# Patient Record
Sex: MALE | Race: WHITE | Employment: OTHER | URBAN - METROPOLITAN AREA
[De-identification: names, ages, dates, MRNs, and addresses within clinical notes are randomized per-mention and may not be internally consistent; named-entity substitution may affect disease eponyms.]

---

## 2017-01-18 DIAGNOSIS — I10 ESSENTIAL HYPERTENSION: ICD-10-CM

## 2017-02-16 ENCOUNTER — OFFICE VISIT (OUTPATIENT)
Dept: ORTHOPEDIC SURGERY | Age: 62
End: 2017-02-16

## 2017-02-16 VITALS
BODY MASS INDEX: 29.06 KG/M2 | WEIGHT: 203 LBS | SYSTOLIC BLOOD PRESSURE: 140 MMHG | DIASTOLIC BLOOD PRESSURE: 85 MMHG | TEMPERATURE: 97.9 F | RESPIRATION RATE: 16 BRPM | HEIGHT: 70 IN | HEART RATE: 72 BPM

## 2017-02-16 DIAGNOSIS — Z96.651 STATUS POST TOTAL RIGHT KNEE REPLACEMENT: Primary | ICD-10-CM

## 2017-02-16 PROCEDURE — 99213 OFFICE O/P EST LOW 20 MIN: CPT | Performed by: PHYSICIAN ASSISTANT

## 2017-02-16 PROCEDURE — 73562 X-RAY EXAM OF KNEE 3: CPT | Performed by: PHYSICIAN ASSISTANT

## 2017-06-08 RX ORDER — SIMVASTATIN 20 MG
TABLET ORAL
Qty: 90 TABLET | Refills: 3 | Status: SHIPPED | OUTPATIENT
Start: 2017-06-08 | End: 2018-06-13 | Stop reason: SDUPTHER

## 2017-11-20 ENCOUNTER — OFFICE VISIT (OUTPATIENT)
Dept: FAMILY MEDICINE CLINIC | Age: 62
End: 2017-11-20

## 2017-11-20 VITALS
SYSTOLIC BLOOD PRESSURE: 152 MMHG | RESPIRATION RATE: 20 BRPM | BODY MASS INDEX: 31.35 KG/M2 | WEIGHT: 219 LBS | HEIGHT: 70 IN | HEART RATE: 77 BPM | DIASTOLIC BLOOD PRESSURE: 92 MMHG

## 2017-11-20 DIAGNOSIS — G89.29 CHRONIC PAIN OF LEFT KNEE: ICD-10-CM

## 2017-11-20 DIAGNOSIS — M25.562 CHRONIC PAIN OF LEFT KNEE: ICD-10-CM

## 2017-11-20 DIAGNOSIS — I10 ESSENTIAL HYPERTENSION: ICD-10-CM

## 2017-11-20 DIAGNOSIS — M25.552 LEFT HIP PAIN: ICD-10-CM

## 2017-11-20 DIAGNOSIS — Z23 NEEDS FLU SHOT: ICD-10-CM

## 2017-11-20 DIAGNOSIS — Z11.59 NEED FOR HEPATITIS C SCREENING TEST: ICD-10-CM

## 2017-11-20 DIAGNOSIS — Z12.11 SCREEN FOR COLON CANCER: ICD-10-CM

## 2017-11-20 DIAGNOSIS — E78.00 HYPERCHOLESTEROLEMIA: ICD-10-CM

## 2017-11-20 DIAGNOSIS — Z00.00 WELL ADULT EXAM: Primary | ICD-10-CM

## 2017-11-20 LAB
A/G RATIO: 1.5 (ref 1.1–2.2)
ALBUMIN SERPL-MCNC: 4.6 G/DL (ref 3.4–5)
ALP BLD-CCNC: 62 U/L (ref 40–129)
ALT SERPL-CCNC: 32 U/L (ref 10–40)
ANION GAP SERPL CALCULATED.3IONS-SCNC: 13 MMOL/L (ref 3–16)
AST SERPL-CCNC: 26 U/L (ref 15–37)
BILIRUB SERPL-MCNC: 0.7 MG/DL (ref 0–1)
BUN BLDV-MCNC: 11 MG/DL (ref 7–20)
CALCIUM SERPL-MCNC: 9.8 MG/DL (ref 8.3–10.6)
CHLORIDE BLD-SCNC: 100 MMOL/L (ref 99–110)
CHOLESTEROL, TOTAL: 193 MG/DL (ref 0–199)
CO2: 27 MMOL/L (ref 21–32)
CREAT SERPL-MCNC: 0.7 MG/DL (ref 0.8–1.3)
GFR AFRICAN AMERICAN: >60
GFR NON-AFRICAN AMERICAN: >60
GLOBULIN: 3 G/DL
GLUCOSE BLD-MCNC: 93 MG/DL (ref 70–99)
HDLC SERPL-MCNC: 56 MG/DL (ref 40–60)
HEPATITIS C ANTIBODY INTERPRETATION: NORMAL
LDL CHOLESTEROL CALCULATED: 80 MG/DL
POTASSIUM SERPL-SCNC: 4 MMOL/L (ref 3.5–5.1)
SODIUM BLD-SCNC: 140 MMOL/L (ref 136–145)
TOTAL PROTEIN: 7.6 G/DL (ref 6.4–8.2)
TRIGL SERPL-MCNC: 283 MG/DL (ref 0–150)
VLDLC SERPL CALC-MCNC: 57 MG/DL

## 2017-11-20 PROCEDURE — 36415 COLL VENOUS BLD VENIPUNCTURE: CPT | Performed by: FAMILY MEDICINE

## 2017-11-20 PROCEDURE — 90471 IMMUNIZATION ADMIN: CPT | Performed by: FAMILY MEDICINE

## 2017-11-20 PROCEDURE — 90686 IIV4 VACC NO PRSV 0.5 ML IM: CPT | Performed by: FAMILY MEDICINE

## 2017-11-20 PROCEDURE — 99396 PREV VISIT EST AGE 40-64: CPT | Performed by: FAMILY MEDICINE

## 2017-11-20 ASSESSMENT — PATIENT HEALTH QUESTIONNAIRE - PHQ9
SUM OF ALL RESPONSES TO PHQ9 QUESTIONS 1 & 2: 0
2. FEELING DOWN, DEPRESSED OR HOPELESS: 0
1. LITTLE INTEREST OR PLEASURE IN DOING THINGS: 0
SUM OF ALL RESPONSES TO PHQ QUESTIONS 1-9: 0

## 2017-11-20 NOTE — PROGRESS NOTES
Chief Complaint   Patient presents with    Annual Exam       Root Rhineland presents for evaluation and management of Will adult check. 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 Now he tells me he walks about 2 miles 3 times a week. He denies an unhealthy diet doesn't snack a lot but does eat a lot of bread. Tells me he eats more sandwiches. Likes to go out with the boys for Monday night football but doesn't drink much alcohol besides that. He is taking and tolerating both his blood pressure and cholesterol medicine denies any problems with them. He is noting some left hip and knee pain with walking, but wants to wait until he follows up with orthopedics in February for evaluation    He expresses an interest in getting hepatitis C screening and would like a flu shot      ROS: no fever or chills, no headache, no visual changes, no runny nose, no sore throat, no ear pain, no swollen glands or thyroid pain/swelling, no cough, no sob, no chest pain, no palpitation, no abd pain, no n/v/d/c, no urinary frequency or dysuria, no focal weakness or numbness, no other joint pain or swelling, no rash or skin changes, noeasy bleeding or bruising,     No Known Allergies  New Prescriptions    No medications on file     Current Outpatient Prescriptions   Medication Sig Dispense Refill    simvastatin (ZOCOR) 20 MG tablet TAKE 1 TABLET BY MOUTH EVERY EVENING 90 tablet 3    metoprolol tartrate (LOPRESSOR) 25 MG tablet TAKE 1 TABLET BY MOUTH 2 TIMES DAILY 180 tablet 3    azelastine (ASTELIN) 0.1 % nasal spray 2 sprays by Nasal route 2 times daily Use in each nostril as directed 1 Bottle 3     No current facility-administered medications for this visit.         Past Medical History:   Diagnosis Date    Allergic rhinitis, seasonal     Essential hypertension 1/28/2016    Hypercholesterolemia 8/23/2012     Past Surgical History:   Procedure Laterality Date    CERVICAL SPINE SURGERY      EYE SURGERY      HEMORRHOID SURGERY  1983    KNEE SURGERY Right 1978    ACL/MCL repair    LASIK      TONSILLECTOMY      TOTAL KNEE ARTHROPLASTY Right 02-19-16    Dr. Howard Travis     Family History   Problem Relation Age of Onset    Heart Disease Mother 68     Stent    Dementia Mother     Diabetes Father     Heart Disease Father 67     stent     Social History   Substance Use Topics    Smoking status: Never Smoker    Smokeless tobacco: Never Used    Alcohol use 0.0 oz/week      Comment: infrequent E[place]h; only during football season       Objective   BP (!) 152/92   Pulse 77   Resp 20   Ht 5' 10\" (1.778 m)   Wt 219 lb (99.3 kg) Comment: 219lb  BMI 31.42 kg/m²   Wt Readings from Last 3 Encounters:   11/20/17 219 lb (99.3 kg)   02/16/17 203 lb (92.1 kg)   08/16/16 203 lb (92.1 kg)       WDWN in NAD  HEENT: NCAT, PERRL, TM's neg, Canals patent, Nares Pink and Patent, Op/OC: pink and narrow Malampati class IV airway with healthy dentition  Neck: no LAD, no TMG, supple and symmetric  Lungs: CTAB BS Equal and Easy, no accessory muscle use  CV: RRR w/o M,R,G, PP2+, no edema  Abd:  BS+, S, ND, NT, no hsm  Neuro:  A&O x 3, CN2-12 grossly intact, Motor/Sensory intact w/o focal deficit, Coordination intact, normal gait  Psych:  Judgement and insight are intact, Nl Speech and motor activity, no MARILYN, no FOI, dressed casually in street clothes  Skin:  Warm dry and intact      Chemistry        Component Value Date/Time     01/28/2016 1600    K 4.2 01/28/2016 1600    CL 99 01/28/2016 1600    CO2 26 01/28/2016 1600    BUN 10 01/28/2016 1600    CREATININE 0.7 (L) 01/28/2016 1600        Component Value Date/Time    CALCIUM 10.0 01/28/2016 1600    ALKPHOS 69 01/28/2016 1600    AST 23 01/28/2016 1600    ALT 25 01/28/2016 1600    BILITOT 0.4 01/28/2016 1600          Lab Results   Component Value Date    WBC 4.8 02/09/2016    HGB 13.2 (L) 02/21/2016    HCT 38.7 (L) 02/21/2016    .0 (H) 02/09/2016     02/09/2016

## 2017-12-26 ENCOUNTER — PAT TELEPHONE (OUTPATIENT)
Dept: PREADMISSION TESTING | Age: 62
End: 2017-12-26

## 2017-12-26 VITALS — BODY MASS INDEX: 31.35 KG/M2 | HEIGHT: 70 IN | WEIGHT: 219 LBS

## 2017-12-26 RX ORDER — CHLORAL HYDRATE 500 MG
3000 CAPSULE ORAL 3 TIMES DAILY
COMMUNITY

## 2017-12-26 RX ORDER — M-VIT,TX,IRON,MINS/CALC/FOLIC 27MG-0.4MG
1 TABLET ORAL DAILY
COMMUNITY

## 2017-12-28 ENCOUNTER — TELEPHONE (OUTPATIENT)
Dept: FAMILY MEDICINE CLINIC | Age: 62
End: 2017-12-28

## 2017-12-28 NOTE — TELEPHONE ENCOUNTER
Attempted to contact patient on 12/28/2017. Result: left message on the patient's voicemail asking patient to return my call. Pre-Visit planning not completed.

## 2018-01-02 ENCOUNTER — OFFICE VISIT (OUTPATIENT)
Dept: FAMILY MEDICINE CLINIC | Age: 63
End: 2018-01-02

## 2018-01-02 VITALS
RESPIRATION RATE: 20 BRPM | DIASTOLIC BLOOD PRESSURE: 78 MMHG | SYSTOLIC BLOOD PRESSURE: 134 MMHG | BODY MASS INDEX: 30.92 KG/M2 | HEIGHT: 70 IN | WEIGHT: 216 LBS | OXYGEN SATURATION: 97 % | HEART RATE: 64 BPM

## 2018-01-02 DIAGNOSIS — E78.00 HYPERCHOLESTEROLEMIA: ICD-10-CM

## 2018-01-02 DIAGNOSIS — Z12.11 SCREEN FOR COLON CANCER: ICD-10-CM

## 2018-01-02 DIAGNOSIS — I10 ESSENTIAL HYPERTENSION: Primary | ICD-10-CM

## 2018-01-02 DIAGNOSIS — E66.09 CLASS 1 OBESITY DUE TO EXCESS CALORIES WITHOUT SERIOUS COMORBIDITY WITH BODY MASS INDEX (BMI) OF 31.0 TO 31.9 IN ADULT: ICD-10-CM

## 2018-01-02 PROCEDURE — 99214 OFFICE O/P EST MOD 30 MIN: CPT | Performed by: FAMILY MEDICINE

## 2018-01-02 NOTE — PATIENT INSTRUCTIONS
Wt Readings from Last 3 Encounters:   01/02/18 216 lb (98 kg)   12/26/17 219 lb (99.3 kg)   11/20/17 219 lb (99.3 kg)       BP Readings from Last 3 Encounters:   01/02/18 134/78   11/20/17 (!) 152/92   02/16/17 140/85       Here are some recommendations for weight loss:  Check into The GI Diet or \"Primal diet\"    Take your desired weight in pounds and multiply by 10 and that is your average daily calorie allowance. For example if you wish to weigh 170 lb x 10 = 1700 ameya/day (this is how to gradually lose the weight and maintain your desired weight). Avoid soda/coke and all \"wet carbs\" => Drink ice water instead    Drink a large glass of ice water before meals and EAT SLOWLY (talk while you eat)! Rethink your hunger => it means your losing weight. Minimize carbohydrates as they stimulate your appetite.   Avoid Bread, Rice, Pasta and Potatoes      Avoid eating calories after 6 pm

## 2018-01-02 NOTE — PROGRESS NOTES
Chief Complaint   Patient presents with    Hypertension       HPI: Alem Diaz presents for evaluation and management of Multiple medical problems. Shital Grande notes she is taking and tolerating his cholesterol medicine denies any muscle aches or stomach pain. He reports is compliant with his blood pressure medicines and not having any trouble with that. He notes he's not really been working on diet or exercise as he is been caring for his frail father who is in a nursing home. He has a colonoscopy scheduled for tomorrow      ROS: no fever or chills, no cough, no sob, no chest pain, no palpitation, no abd pain, no n/v/d/c    No Known Allergies  New Prescriptions    No medications on file     Current Outpatient Prescriptions   Medication Sig Dispense Refill    Coenzyme Q10 (COQ10 PO) Take by mouth      Omega-3 Fatty Acids (FISH OIL) 1000 MG CAPS Take 3,000 mg by mouth 3 times daily      Multiple Vitamins-Minerals (THERAPEUTIC MULTIVITAMIN-MINERALS) tablet Take 1 tablet by mouth daily      simvastatin (ZOCOR) 20 MG tablet TAKE 1 TABLET BY MOUTH EVERY EVENING 90 tablet 3    metoprolol tartrate (LOPRESSOR) 25 MG tablet TAKE 1 TABLET BY MOUTH 2 TIMES DAILY 180 tablet 3    azelastine (ASTELIN) 0.1 % nasal spray 2 sprays by Nasal route 2 times daily Use in each nostril as directed 1 Bottle 3     No current facility-administered medications for this visit.         Past Medical History:   Diagnosis Date    Allergic rhinitis, seasonal     Essential hypertension 1/28/2016    Hypercholesterolemia 8/23/2012         Objective   /78   Pulse 64   Resp 20   Ht 5' 10\" (1.778 m)   Wt 216 lb (98 kg)   SpO2 97%   BMI 30.99 kg/m²   Wt Readings from Last 3 Encounters:   01/02/18 216 lb (98 kg)   12/26/17 219 lb (99.3 kg)   11/20/17 219 lb (99.3 kg)       WDWN in NAD, Obese  Lungs: CTAB BS Equal and Easy, no accessory muscle use  CV: RRR w/o M,R,G, PP2+, no edema  Abd:  BS+, S, ND, NT, no hsm  Psych: Judgement and insight are intact, Nl Speech and motor activity, no MARILYN, no FOI, dressed casually in street clothes  Skin:  Warm dry and intact      Chemistry        Component Value Date/Time     11/20/2017 0816    K 4.0 11/20/2017 0816     11/20/2017 0816    CO2 27 11/20/2017 0816    BUN 11 11/20/2017 0816    CREATININE 0.7 (L) 11/20/2017 0816        Component Value Date/Time    CALCIUM 9.8 11/20/2017 0816    ALKPHOS 62 11/20/2017 0816    AST 26 11/20/2017 0816    ALT 32 11/20/2017 0816    BILITOT 0.7 11/20/2017 0816          Lab Results   Component Value Date    WBC 4.8 02/09/2016    HGB 13.2 (L) 02/21/2016    HCT 38.7 (L) 02/21/2016    .0 (H) 02/09/2016     02/09/2016     Lab Results   Component Value Date    LABA1C 5.3 02/20/2016     Lab Results   Component Value Date    .4 02/20/2016     Lab Results   Component Value Date    LABA1C 5.3 02/20/2016     No components found for: CHLPL  Lab Results   Component Value Date    TRIG 283 (H) 11/20/2017    TRIG 139 04/16/2015    TRIG 70 01/17/2014     Lab Results   Component Value Date    HDL 56 11/20/2017    HDL 60 04/16/2015    HDL 65 01/17/2014     Lab Results   Component Value Date    LDLCALC 80 11/20/2017    LDLCALC 111 (H) 04/16/2015    LDLCALC 127 (H) 08/23/2012     Lab Results   Component Value Date    LABVLDL 57 11/20/2017    LABVLDL 28 04/16/2015    LABVLDL 33 08/23/2012         Assessment   Plan     1. Essential hypertension  Controlled: Continue meds recheck 6 months    2. Hypercholesterolemia  Appears stable: Continue meds recheck 6 months    3. Screen for colon cancer  Plan is for screening colonoscopy tomorrow per Dr. Deyvi Russo    4.  Class 1 obesity due to excess calories without serious comorbidity with body mass index (BMI) of 31.0 to 31.9 in adult  Counseled patient to work on diet and exercise follow-up 6 months    Catrachita Villarreal received counseling on the following healthy behaviors: nutrition and exercise    Patient given educational materials on Nutrition    Discussed use, benefit, and side effects of prescribed medications. Barriers to medication compliance addressed. All patient questions answered. Pt voiced understanding.        RTC 6 months

## 2018-01-03 ENCOUNTER — HOSPITAL ENCOUNTER (OUTPATIENT)
Dept: ENDOSCOPY | Age: 63
Discharge: HOME | End: 2018-01-03
Attending: INTERNAL MEDICINE | Admitting: INTERNAL MEDICINE

## 2018-01-03 VITALS
SYSTOLIC BLOOD PRESSURE: 129 MMHG | RESPIRATION RATE: 16 BRPM | HEIGHT: 70 IN | OXYGEN SATURATION: 99 % | DIASTOLIC BLOOD PRESSURE: 90 MMHG | BODY MASS INDEX: 30.64 KG/M2 | HEART RATE: 72 BPM | TEMPERATURE: 97.6 F | WEIGHT: 214 LBS

## 2018-01-03 RX ORDER — SODIUM CHLORIDE 9 MG/ML
INJECTION, SOLUTION INTRAVENOUS CONTINUOUS
Status: DISCONTINUED | OUTPATIENT
Start: 2018-01-03 | End: 2018-01-04 | Stop reason: HOSPADM

## 2018-01-03 RX ORDER — SODIUM CHLORIDE 0.9 % (FLUSH) 0.9 %
10 SYRINGE (ML) INJECTION EVERY 12 HOURS SCHEDULED
Status: DISCONTINUED | OUTPATIENT
Start: 2018-01-03 | End: 2018-01-04 | Stop reason: HOSPADM

## 2018-01-03 RX ORDER — SODIUM CHLORIDE 0.9 % (FLUSH) 0.9 %
10 SYRINGE (ML) INJECTION PRN
Status: DISCONTINUED | OUTPATIENT
Start: 2018-01-03 | End: 2018-01-04 | Stop reason: HOSPADM

## 2018-01-03 RX ADMIN — SODIUM CHLORIDE: 9 INJECTION, SOLUTION INTRAVENOUS at 09:15

## 2018-01-03 ASSESSMENT — PAIN SCALES - GENERAL
PAINLEVEL_OUTOF10: 0

## 2018-01-03 ASSESSMENT — PAIN - FUNCTIONAL ASSESSMENT: PAIN_FUNCTIONAL_ASSESSMENT: 0-10

## 2018-01-03 NOTE — H&P
[facility]   Pre-operative History and Physical    Patient: Jacey Cooley  : 1955  Acct#: [de-identified]    History Obtained From: electronic medical record    HISTORY OF PRESENT ILLNESS  Procedure:Colonoscopy  Indications:screening  Past Medical History:        Diagnosis Date    Allergic rhinitis, seasonal     Essential hypertension 2016    Hypercholesterolemia 2012     Past Surgical History:        Procedure Laterality Date    BACK SURGERY      CERVICAL SPINE SURGERY      EYE SURGERY      HEMORRHOID SURGERY      JOINT REPLACEMENT      KNEE SURGERY Right     ACL/MCL repair    LASIK      TONSILLECTOMY      TOTAL KNEE ARTHROPLASTY Right 16    Dr. Erasto Devi     Medications Prior to Admission:   Current Outpatient Prescriptions on File Prior to Encounter   Medication Sig Dispense Refill    Coenzyme Q10 (COQ10 PO) Take by mouth      Omega-3 Fatty Acids (FISH OIL) 1000 MG CAPS Take 3,000 mg by mouth 3 times daily      Multiple Vitamins-Minerals (THERAPEUTIC MULTIVITAMIN-MINERALS) tablet Take 1 tablet by mouth daily      simvastatin (ZOCOR) 20 MG tablet TAKE 1 TABLET BY MOUTH EVERY EVENING 90 tablet 3    metoprolol tartrate (LOPRESSOR) 25 MG tablet TAKE 1 TABLET BY MOUTH 2 TIMES DAILY 180 tablet 3    azelastine (ASTELIN) 0.1 % nasal spray 2 sprays by Nasal route 2 times daily Use in each nostril as directed 1 Bottle 3     No current facility-administered medications on file prior to encounter. Allergies:  Review of patient's allergies indicates no known allergies.   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      Social History Main Topics    Smoking status: Never Smoker    Smokeless tobacco: Never Used    Alcohol use 0.0 oz/week    Drug use: No    Sexual activity: No     Other Topics Concern    Not on file     Social History Narrative    No narrative on file     Family History   Problem Relation Age of Onset    Heart Disease Mother 68     Stent    Dementia Mother     Diabetes Father     Heart Disease Father 67     stent         PHYSICAL EXAM:      BP (!) 153/77   Pulse 80   Temp 97.3 °F (36.3 °C) (Temporal)   Resp 16   Ht 5' 10\" (1.778 m)   Wt 214 lb (97.1 kg)   SpO2 96%   BMI 30.71 kg/m²  I        Heart:normal    Lungs: normal    Abdomen: normal      ASA Grade:  See anesthesia note      ASSESSMENT AND PLAN:    1. Procedure options, risks and benefits reviewed with patient and expresses understanding.

## 2018-01-03 NOTE — PROGRESS NOTES
Pt sitting up in bed, tolerating PO fluids. DC instructions given and reviewed with pt and pts friend, verbalized understanding.

## 2018-01-03 NOTE — ANESTHESIA PRE-OP
hypothyroidism, no Type II DM               Abdominal:           Vascular: negative vascular ROS. Anesthesia Plan      MAC     ASA 2       Induction: intravenous. Anesthetic plan and risks discussed with patient.                       Vanna Martins MD   1/3/2018

## 2018-01-17 DIAGNOSIS — I10 ESSENTIAL HYPERTENSION: ICD-10-CM

## 2018-02-21 ENCOUNTER — OFFICE VISIT (OUTPATIENT)
Dept: ORTHOPEDIC SURGERY | Age: 63
End: 2018-02-21

## 2018-02-21 VITALS
DIASTOLIC BLOOD PRESSURE: 82 MMHG | HEIGHT: 70 IN | RESPIRATION RATE: 16 BRPM | SYSTOLIC BLOOD PRESSURE: 127 MMHG | WEIGHT: 214 LBS | BODY MASS INDEX: 30.64 KG/M2 | TEMPERATURE: 97.3 F

## 2018-02-21 DIAGNOSIS — Z96.651 STATUS POST TOTAL RIGHT KNEE REPLACEMENT: Primary | ICD-10-CM

## 2018-02-21 PROCEDURE — 99212 OFFICE O/P EST SF 10 MIN: CPT | Performed by: PHYSICIAN ASSISTANT

## 2018-02-21 NOTE — PROGRESS NOTES
this visit. Objective:     He is alert, oriented x 3, pleasant, well nourished, developed and in no acute distress. /82   Temp 97.3 °F (36.3 °C)   Resp 16   Ht 5' 10\" (1.778 m)   Wt 214 lb (97.1 kg)   BMI 30.71 kg/m²      KNEE EXAM:  Examination of the right knee shows: There is  no obvious deformity. There is not erythema. There is minimal to no soft tissue swelling. There is no significant joint effusion. AROM-  Extension 0                     Flexion  130  There is no pain associated with ROM testing. Medial joint line is not tender to palpation. Lateral joint line is not tender to palpation. There is not crepitus along the joint line with ROM testing. There is no significant instability with varus or valgus stress testing. Anterior Drawer test is negative for instability. Extensor Mechanism is  intact. X Rays: performed in the office today:   AP, Lateral and Sunrise of the right Knee(s) : There is a right prosthetic total knee arthroplasty present. The alignment is satisfactory. There are no signs of significant failure or loosening. Assessment:       ICD-10-CM ICD-9-CM    1. Status post total right knee replacement 2-19-16 Z96.651 V43.65 XR KNEE RIGHT (3 VIEWS)        Plan:     The natural history of the patient's diagnosis as well as the treatment options were discussed in full and questions were answered. Risks and benefits of the treatment options also reviewed in detail. Continue with a HEP-  A home exercise program was re-instructed today including ROM exercises and strengthening exercises. The patient verbalized understanding of these exercises as well as the importance of the exercise program to promote return of normal function. If pain intensifies or other problems arise you are to notify the office. Prophylactic antibiotics for any surgical or dental procedures. This is recommended for lifetime.     Follow up yearly with x ray and

## 2018-06-13 RX ORDER — SIMVASTATIN 20 MG
TABLET ORAL
Qty: 90 TABLET | Refills: 3 | Status: SHIPPED | OUTPATIENT
Start: 2018-06-13 | End: 2019-03-11 | Stop reason: SDUPTHER

## 2018-06-27 ENCOUNTER — TELEPHONE (OUTPATIENT)
Dept: FAMILY MEDICINE CLINIC | Age: 63
End: 2018-06-27

## 2018-06-27 DIAGNOSIS — E78.00 HYPERCHOLESTEROLEMIA: Primary | ICD-10-CM

## 2018-06-29 NOTE — TELEPHONE ENCOUNTER
Dr. Missael Schmitz:    Notes: This patient has an upcoming appointment with you for Hyperlipidemia. In planning for that visit I have completed the following pre-visit planning:     Pre-Visit Planning Checklist:  Patient contacted: yes  Verified patient by name and date of birth: yes    Health Maintenance items reviewed:    No pre-visit planning health maintenance topics to review at this time    Labs and procedures pended:     Labs and procedures discussed with patient: yes  Reminded patient to check with their insurance company about coverage for lab tests and lab location: no    Preliminary Medication Reconciliation: was performed. Reminded patient to arrive early: yes    Please complete the med-reconciliation and sign the appropriate labs as soon as possible.       James Clark, [address]  Pre-Services Specialist

## 2018-07-02 ENCOUNTER — OFFICE VISIT (OUTPATIENT)
Dept: FAMILY MEDICINE CLINIC | Age: 63
End: 2018-07-02

## 2018-07-02 ENCOUNTER — TELEPHONE (OUTPATIENT)
Dept: FAMILY MEDICINE CLINIC | Age: 63
End: 2018-07-02

## 2018-07-02 VITALS
DIASTOLIC BLOOD PRESSURE: 79 MMHG | HEIGHT: 71 IN | SYSTOLIC BLOOD PRESSURE: 130 MMHG | BODY MASS INDEX: 30.52 KG/M2 | HEART RATE: 68 BPM | WEIGHT: 218 LBS

## 2018-07-02 DIAGNOSIS — E78.00 HYPERCHOLESTEROLEMIA: ICD-10-CM

## 2018-07-02 DIAGNOSIS — I10 ESSENTIAL HYPERTENSION: Primary | ICD-10-CM

## 2018-07-02 DIAGNOSIS — E66.09 CLASS 1 OBESITY DUE TO EXCESS CALORIES WITHOUT SERIOUS COMORBIDITY WITH BODY MASS INDEX (BMI) OF 30.0 TO 30.9 IN ADULT: ICD-10-CM

## 2018-07-02 LAB
A/G RATIO: 1.5 (ref 1.1–2.2)
ALBUMIN SERPL-MCNC: 4.8 G/DL (ref 3.4–5)
ALP BLD-CCNC: 67 U/L (ref 40–129)
ALT SERPL-CCNC: 25 U/L (ref 10–40)
ANION GAP SERPL CALCULATED.3IONS-SCNC: 16 MMOL/L (ref 3–16)
AST SERPL-CCNC: 24 U/L (ref 15–37)
BILIRUB SERPL-MCNC: 0.9 MG/DL (ref 0–1)
BUN BLDV-MCNC: 12 MG/DL (ref 7–20)
CALCIUM SERPL-MCNC: 10.3 MG/DL (ref 8.3–10.6)
CHLORIDE BLD-SCNC: 97 MMOL/L (ref 99–110)
CO2: 26 MMOL/L (ref 21–32)
CREAT SERPL-MCNC: 0.9 MG/DL (ref 0.8–1.3)
GFR AFRICAN AMERICAN: >60
GFR NON-AFRICAN AMERICAN: >60
GLOBULIN: 3.1 G/DL
GLUCOSE BLD-MCNC: 95 MG/DL (ref 70–99)
POTASSIUM SERPL-SCNC: 4.4 MMOL/L (ref 3.5–5.1)
SODIUM BLD-SCNC: 139 MMOL/L (ref 136–145)
TOTAL PROTEIN: 7.9 G/DL (ref 6.4–8.2)

## 2018-07-02 PROCEDURE — 36415 COLL VENOUS BLD VENIPUNCTURE: CPT | Performed by: FAMILY MEDICINE

## 2018-07-02 PROCEDURE — 99214 OFFICE O/P EST MOD 30 MIN: CPT | Performed by: FAMILY MEDICINE

## 2018-07-02 RX ORDER — ASPIRIN 81 MG/1
81 TABLET ORAL DAILY
Qty: 30 TABLET | Refills: 11
Start: 2018-07-02 | End: 2022-03-21

## 2018-07-02 ASSESSMENT — ENCOUNTER SYMPTOMS
COUGH: 0
SHORTNESS OF BREATH: 0
VOMITING: 0
ABDOMINAL PAIN: 0
CONSTIPATION: 0
DIARRHEA: 0
NAUSEA: 0

## 2018-07-02 ASSESSMENT — PATIENT HEALTH QUESTIONNAIRE - PHQ9
2. FEELING DOWN, DEPRESSED OR HOPELESS: 0
1. LITTLE INTEREST OR PLEASURE IN DOING THINGS: 0
SUM OF ALL RESPONSES TO PHQ QUESTIONS 1-9: 0
SUM OF ALL RESPONSES TO PHQ9 QUESTIONS 1 & 2: 0

## 2018-07-02 NOTE — PROGRESS NOTES
Call Results (labs or images) were normal.  Keep follow up as suggested. Ask patient to sign up for MyChart.
Never Smoker    Smokeless tobacco: Never Used    Alcohol use 0.0 oz/week    Drug use: No    Sexual activity: No     Other Topics Concern    Not on file     Social History Narrative    No narrative on file       New Prescriptions    No medications on file         Meds Prior to visit:  Prior to Visit Medications    Medication Sig Taking? Authorizing Provider   simvastatin (ZOCOR) 20 MG tablet TAKE 1 TABLET BY MOUTH EVERY EVENING Yes Celeste Scanlon MD   metoprolol tartrate (LOPRESSOR) 25 MG tablet TAKE 1 TABLET BY MOUTH 2 TIMES DAILY Yes Celeste Scanlon MD   Coenzyme Q10 (COQ10 PO) Take by mouth Yes Historical Provider, MD   Omega-3 Fatty Acids (FISH OIL) 1000 MG CAPS Take 3,000 mg by mouth 3 times daily Yes Historical Provider, MD   Multiple Vitamins-Minerals (THERAPEUTIC MULTIVITAMIN-MINERALS) tablet Take 1 tablet by mouth daily Yes Historical Provider, MD     No Known Allergies    OBJECTIVE:    /79   Pulse 68   Ht 5' 11\" (1.803 m)   Wt 218 lb (98.9 kg)   BMI 30.40 kg/m²   BP Readings from Last 2 Encounters:   07/02/18 130/79   02/21/18 127/82     Wt Readings from Last 3 Encounters:   07/02/18 218 lb (98.9 kg)   02/21/18 214 lb (97.1 kg)   01/03/18 214 lb (97.1 kg)       Physical Exam   Constitutional: He appears well-developed and well-nourished. Cardiovascular: Normal rate and regular rhythm. Exam reveals no gallop and no friction rub. No murmur heard. Pulses:       Dorsalis pedis pulses are 2+ on the right side, and 2+ on the left side. Posterior tibial pulses are 2+ on the right side, and 2+ on the left side. No edema lower extremities     Pulmonary/Chest: Effort normal and breath sounds normal. He has no wheezes. He has no rales. Abdominal: Soft. Bowel sounds are normal. He exhibits no distension and no mass. There is no tenderness. Skin: Skin is warm and dry. No rash noted. Nursing note and vitals reviewed.           Hemoglobin A1C (%)   Date Value   02/20/2016 5.3

## 2019-01-03 ENCOUNTER — OFFICE VISIT (OUTPATIENT)
Dept: PRIMARY CARE CLINIC | Age: 64
End: 2019-01-03
Payer: COMMERCIAL

## 2019-01-03 VITALS
WEIGHT: 216 LBS | HEART RATE: 71 BPM | HEIGHT: 71 IN | SYSTOLIC BLOOD PRESSURE: 138 MMHG | BODY MASS INDEX: 30.24 KG/M2 | DIASTOLIC BLOOD PRESSURE: 88 MMHG

## 2019-01-03 DIAGNOSIS — Z23 NEED FOR VACCINATION: ICD-10-CM

## 2019-01-03 DIAGNOSIS — Z91.89 HIGH RISK OF CARDIAC EVENT: ICD-10-CM

## 2019-01-03 DIAGNOSIS — I10 ESSENTIAL HYPERTENSION: ICD-10-CM

## 2019-01-03 DIAGNOSIS — E78.00 HYPERCHOLESTEROLEMIA: ICD-10-CM

## 2019-01-03 DIAGNOSIS — Z00.00 WELL ADULT EXAM: Primary | ICD-10-CM

## 2019-01-03 LAB
A/G RATIO: 1.6 (ref 1.1–2.2)
ALBUMIN SERPL-MCNC: 4.9 G/DL (ref 3.4–5)
ALP BLD-CCNC: 62 U/L (ref 40–129)
ALT SERPL-CCNC: 30 U/L (ref 10–40)
ANION GAP SERPL CALCULATED.3IONS-SCNC: 16 MMOL/L (ref 3–16)
AST SERPL-CCNC: 23 U/L (ref 15–37)
BILIRUB SERPL-MCNC: 0.8 MG/DL (ref 0–1)
BUN BLDV-MCNC: 10 MG/DL (ref 7–20)
CALCIUM SERPL-MCNC: 10.1 MG/DL (ref 8.3–10.6)
CHLORIDE BLD-SCNC: 99 MMOL/L (ref 99–110)
CHOLESTEROL, TOTAL: 193 MG/DL (ref 0–199)
CO2: 26 MMOL/L (ref 21–32)
CREAT SERPL-MCNC: 0.8 MG/DL (ref 0.8–1.3)
GFR AFRICAN AMERICAN: >60
GFR NON-AFRICAN AMERICAN: >60
GLOBULIN: 3.1 G/DL
GLUCOSE BLD-MCNC: 101 MG/DL (ref 70–99)
HDLC SERPL-MCNC: 53 MG/DL (ref 40–60)
LDL CHOLESTEROL CALCULATED: 100 MG/DL
POTASSIUM SERPL-SCNC: 4.6 MMOL/L (ref 3.5–5.1)
SODIUM BLD-SCNC: 141 MMOL/L (ref 136–145)
TOTAL PROTEIN: 8 G/DL (ref 6.4–8.2)
TRIGL SERPL-MCNC: 201 MG/DL (ref 0–150)
VLDLC SERPL CALC-MCNC: 40 MG/DL

## 2019-01-03 PROCEDURE — 90686 IIV4 VACC NO PRSV 0.5 ML IM: CPT | Performed by: FAMILY MEDICINE

## 2019-01-03 PROCEDURE — 90471 IMMUNIZATION ADMIN: CPT | Performed by: FAMILY MEDICINE

## 2019-01-03 PROCEDURE — 99396 PREV VISIT EST AGE 40-64: CPT | Performed by: FAMILY MEDICINE

## 2019-01-03 ASSESSMENT — ENCOUNTER SYMPTOMS
RHINORRHEA: 0
COLOR CHANGE: 0
VOMITING: 0
COUGH: 0
SORE THROAT: 0
EYE PAIN: 0
ABDOMINAL PAIN: 0
CONSTIPATION: 0
NAUSEA: 0
DIARRHEA: 0
SHORTNESS OF BREATH: 0

## 2019-01-03 ASSESSMENT — PATIENT HEALTH QUESTIONNAIRE - PHQ9
2. FEELING DOWN, DEPRESSED OR HOPELESS: 0
SUM OF ALL RESPONSES TO PHQ9 QUESTIONS 1 & 2: 0
SUM OF ALL RESPONSES TO PHQ QUESTIONS 1-9: 0
SUM OF ALL RESPONSES TO PHQ QUESTIONS 1-9: 0
1. LITTLE INTEREST OR PLEASURE IN DOING THINGS: 0

## 2019-01-14 DIAGNOSIS — I10 ESSENTIAL HYPERTENSION: ICD-10-CM

## 2019-02-19 ENCOUNTER — OFFICE VISIT (OUTPATIENT)
Dept: ORTHOPEDIC SURGERY | Age: 64
End: 2019-02-19
Payer: COMMERCIAL

## 2019-02-19 VITALS
SYSTOLIC BLOOD PRESSURE: 136 MMHG | HEART RATE: 72 BPM | TEMPERATURE: 98.1 F | WEIGHT: 215 LBS | BODY MASS INDEX: 30.1 KG/M2 | DIASTOLIC BLOOD PRESSURE: 78 MMHG | HEIGHT: 71 IN

## 2019-02-19 DIAGNOSIS — Z96.651 STATUS POST TOTAL RIGHT KNEE REPLACEMENT: Primary | ICD-10-CM

## 2019-02-19 PROCEDURE — 99212 OFFICE O/P EST SF 10 MIN: CPT | Performed by: PHYSICIAN ASSISTANT

## 2019-03-11 RX ORDER — SIMVASTATIN 20 MG
20 TABLET ORAL NIGHTLY
Qty: 90 TABLET | Refills: 3 | Status: SHIPPED | OUTPATIENT
Start: 2019-03-11 | End: 2020-04-06

## 2019-07-09 ENCOUNTER — OFFICE VISIT (OUTPATIENT)
Dept: PRIMARY CARE CLINIC | Age: 64
End: 2019-07-09
Payer: COMMERCIAL

## 2019-07-09 VITALS
WEIGHT: 214.2 LBS | SYSTOLIC BLOOD PRESSURE: 128 MMHG | BODY MASS INDEX: 29.99 KG/M2 | HEART RATE: 65 BPM | DIASTOLIC BLOOD PRESSURE: 72 MMHG | HEIGHT: 71 IN

## 2019-07-09 DIAGNOSIS — E66.3 OVERWEIGHT: ICD-10-CM

## 2019-07-09 DIAGNOSIS — E78.00 HYPERCHOLESTEROLEMIA: ICD-10-CM

## 2019-07-09 DIAGNOSIS — I10 ESSENTIAL HYPERTENSION: Primary | ICD-10-CM

## 2019-07-09 PROCEDURE — 99213 OFFICE O/P EST LOW 20 MIN: CPT | Performed by: FAMILY MEDICINE

## 2019-07-09 ASSESSMENT — ENCOUNTER SYMPTOMS
COUGH: 0
NAUSEA: 0
SHORTNESS OF BREATH: 0
DIARRHEA: 0
VOMITING: 0
ABDOMINAL PAIN: 0
CONSTIPATION: 0

## 2019-07-09 ASSESSMENT — PATIENT HEALTH QUESTIONNAIRE - PHQ9
SUM OF ALL RESPONSES TO PHQ9 QUESTIONS 1 & 2: 0
SUM OF ALL RESPONSES TO PHQ QUESTIONS 1-9: 0
2. FEELING DOWN, DEPRESSED OR HOPELESS: 0
1. LITTLE INTEREST OR PLEASURE IN DOING THINGS: 0
SUM OF ALL RESPONSES TO PHQ QUESTIONS 1-9: 0

## 2019-07-09 NOTE — PROGRESS NOTES
Chief Complaint   Patient presents with    Hypertension    Hyperlipidemia    Other     overwieght          HPI:  Jj Webster is a 61 y.o. (:1955) here today for multiple medical issues      He is compliant with his blood pressure medications  without Lightheadedness, Urinary Frequency, Edema and Sedation  He is not checking Home Blood Pressures         He is compliant with his cholesterol medication without myalgia and abdominal pain      He has a history of being overweight  His weight at his 1/3/19 visit was 216 lb  He says he would like to lose about 30 pounds but he has left knee pain. He does still walk daily  He says he does not drink any soda hardly anymore  He says he drinks more water      Review of Systems   Constitutional: Negative for chills and fever. Respiratory: Negative for cough and shortness of breath. Cardiovascular: Negative for chest pain and palpitations. Gastrointestinal: Negative for abdominal pain, constipation, diarrhea, nausea and vomiting. Endocrine: Negative for polyuria. Genitourinary: Negative for dysuria.        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

## 2020-01-15 ENCOUNTER — OFFICE VISIT (OUTPATIENT)
Dept: PRIMARY CARE CLINIC | Age: 65
End: 2020-01-15
Payer: COMMERCIAL

## 2020-01-15 VITALS
DIASTOLIC BLOOD PRESSURE: 89 MMHG | HEART RATE: 65 BPM | HEIGHT: 71 IN | SYSTOLIC BLOOD PRESSURE: 139 MMHG | BODY MASS INDEX: 30.8 KG/M2 | WEIGHT: 220 LBS

## 2020-01-15 DIAGNOSIS — E78.00 HYPERCHOLESTEROLEMIA: ICD-10-CM

## 2020-01-15 DIAGNOSIS — I10 ESSENTIAL HYPERTENSION: ICD-10-CM

## 2020-01-15 PROCEDURE — 99214 OFFICE O/P EST MOD 30 MIN: CPT | Performed by: FAMILY MEDICINE

## 2020-01-15 ASSESSMENT — ENCOUNTER SYMPTOMS
SHORTNESS OF BREATH: 0
COUGH: 0
NAUSEA: 0
ABDOMINAL PAIN: 0
DIARRHEA: 0
CONSTIPATION: 0
VOMITING: 0

## 2020-01-15 ASSESSMENT — PATIENT HEALTH QUESTIONNAIRE - PHQ9
1. LITTLE INTEREST OR PLEASURE IN DOING THINGS: 0
2. FEELING DOWN, DEPRESSED OR HOPELESS: 0
SUM OF ALL RESPONSES TO PHQ9 QUESTIONS 1 & 2: 0
SUM OF ALL RESPONSES TO PHQ QUESTIONS 1-9: 0
SUM OF ALL RESPONSES TO PHQ QUESTIONS 1-9: 0

## 2020-01-15 NOTE — PROGRESS NOTES
week: Not on file     Minutes per session: Not on file    Stress: Not on file   Relationships    Social connections:     Talks on phone: Not on file     Gets together: Not on file     Attends [employer] service: Not on file     Active member of club or organization: Not on file     Attends meetings of clubs or organizations: Not on file     Relationship status: Not on file    Intimate partner violence:     Fear of current or ex partner: Not on file     Emotionally abused: Not on file     Physically abused: Not on file     Forced sexual activity: Not on file   Other Topics Concern    Not on file   Social History Narrative    Not on file       New Prescriptions    No medications on file         Meds Prior to visit:  Prior to Visit Medications    Medication Sig Taking? Authorizing Provider   simvastatin (ZOCOR) 20 MG tablet Take 1 tablet by mouth nightly Yes Lacy Butcher MD   metoprolol tartrate (LOPRESSOR) 25 MG tablet TAKE 1 TABLET BY MOUTH 2 TIMES DAILY Yes Lacy Butcher MD   aspirin EC 81 MG EC tablet Take 1 tablet by mouth daily Yes Lacy Butcher MD   Coenzyme Q10 (COQ10 PO) Take by mouth Yes Historical Provider, MD   Omega-3 Fatty Acids (FISH OIL) 1000 MG CAPS Take 3,000 mg by mouth 3 times daily Yes Historical Provider, MD   Multiple Vitamins-Minerals (THERAPEUTIC MULTIVITAMIN-MINERALS) tablet Take 1 tablet by mouth daily Yes Historical Provider, MD   diphenhydrAMINE HCl ([employer] ALLERGY RELIEF ADULT PO) Take by mouth daily  Historical Provider, MD     No Known Allergies    OBJECTIVE:    /89   Pulse 65   Ht 5' 11\" (1.803 m)   Wt 220 lb (99.8 kg)   BMI 30.68 kg/m²   BP Readings from Last 2 Encounters:   01/15/20 139/89   07/09/19 128/72     Wt Readings from Last 3 Encounters:   01/15/20 220 lb (99.8 kg)   07/09/19 214 lb 3.2 oz (97.2 kg)   02/19/19 215 lb (97.5 kg)       Physical Exam  Vitals signs reviewed. Constitutional:       Appearance: He is well-developed.    Cardiovascular:      Rate and Rhythm: Normal rate and regular rhythm. Pulses:           Dorsalis pedis pulses are 2+ on the right side and 2+ on the left side. Posterior tibial pulses are 2+ on the right side and 2+ on the left side. Heart sounds: No murmur. No friction rub. No gallop. Comments: No Lower Extremity Edema  Pulmonary:      Effort: Pulmonary effort is normal.      Breath sounds: Normal breath sounds. No wheezing or rales. Abdominal:      General: Bowel sounds are normal. There is no distension. Palpations: Abdomen is soft. There is no mass. Tenderness: There is no tenderness. Skin:     General: Skin is warm and dry. Findings: No rash. Hemoglobin A1C (%)   Date Value   02/20/2016 5.3     Microscopic Examination (no units)   Date Value   02/09/2016 Not Indicated     LDL Calculated (mg/dL)   Date Value   01/03/2019 100 (H)       ASSESSMENT/PLAN:    1. Essential hypertension  Controlled: Appears stable. We will continue current management and monitor for adverse reaction and disease progression. Follow-up as noted below    - Comprehensive Metabolic Panel; Future    2. Hypercholesterolemia  Controlled: Appears stable. We will continue current management and monitor for adverse reaction and disease progression. Follow-up as noted below    - Lipid Panel; Future  - Comprehensive Metabolic Panel; Future    3. Class 1 obesity due to excess calories without serious comorbidity with body mass index (BMI) of 30.0 to 30.9 in adult  Counseled on diet and exercise and motivational interviewing for habits. RTC Return in about 24 weeks (around 7/1/2020).     Scribe attestation:  Medina Clayton MA, am scribing for and in the presence of Alessio Banks MD. Electronically signed by Cristina Hall MA on 1/15/2020 at 10:43 AM            Provider attestation: Samara Hudson MD  personally performed the services described in this documentation, as scribed by the user listed above in my presence, and it is both accurate and complete. I agree with the Chief Complaint, ROS, and Past Histories independently gathered by the clinical support staff and the remaining scribed note accurately describes my personal service to the patient.     1/15/2020    10:43 AM

## 2020-01-15 NOTE — PATIENT INSTRUCTIONS
If you want to feel better these are the FUNDAMENTAL PILLARS of Wellness:    1)  You can choose to Get 150 min/week of moderate exercise (can talk but can't sing) or 75 min/week of vigorous exercise (can't talk)   This will enhance your sense of well being (Exercise is as good as medicine for depression.)    2)  You can choose to Get 7-9 hours of sleep per night    Detoxifies your brain, reduces risk of dementia    3)  You can choose to Strength Train 2 x a week on non-consecutive days   This will improve function and reduce risk of injury. Body weight type exercises such as Yoga and Pilates are good    4)  You can choose good nutrition. Only eat your goal weight (in lbs) x 10 calories/day and get 5 servings of Vegetables/day   Plant based diets reduce risk of heart attack/stroke and will help you feel full on less food. Avoid highly processed foods and processed carbohydrates. 5)  You can choose moderate alcohol intake < 1-2 drinks/day   Alcohol will disrupt your sleep and add calories to your day    6)  You can choose to develop a Charismatic/Supportive relationship. This will strengthen your resilience for the ups and downs. 7)  You can choose to Practice Mindfulness. An hour a day of prayer/meditation/gratitude will change your life! Here are some recommendations for weight loss:  Check into The GI Diet or \"Primal diet\", Intermittent fasting. Take your desired weight in pounds and multiply by 10 and that is your average daily calorie allowance. For example if you wish to weigh 170 lb x 10 = 1700 ameya/day (this is how to gradually lose the weight and maintain your desired weight). Avoid soda/coke and all \"wet carbs\" => Drink ice water instead    Drink a large glass of ice water before meals and EAT SLOWLY (talk while you eat)! Rethink your hunger => it means your losing weight. Minimize carbohydrates as they stimulate your appetite.   Avoid Bread, Rice, Pasta and Potatoes      Avoid eating calories after 6 pm  Try taking your calories only in 6 hours of the day - fast the rest of the day

## 2020-01-16 LAB
A/G RATIO: 1.4 (ref 1.1–2.2)
ALBUMIN SERPL-MCNC: 4.6 G/DL (ref 3.4–5)
ALP BLD-CCNC: 61 U/L (ref 40–129)
ALT SERPL-CCNC: 31 U/L (ref 10–40)
ANION GAP SERPL CALCULATED.3IONS-SCNC: 13 MMOL/L (ref 3–16)
AST SERPL-CCNC: 24 U/L (ref 15–37)
BILIRUB SERPL-MCNC: 0.5 MG/DL (ref 0–1)
BUN BLDV-MCNC: 13 MG/DL (ref 7–20)
CALCIUM SERPL-MCNC: 9.5 MG/DL (ref 8.3–10.6)
CHLORIDE BLD-SCNC: 99 MMOL/L (ref 99–110)
CHOLESTEROL, TOTAL: 163 MG/DL (ref 0–199)
CO2: 27 MMOL/L (ref 21–32)
CREAT SERPL-MCNC: 0.8 MG/DL (ref 0.8–1.3)
GFR AFRICAN AMERICAN: >60
GFR NON-AFRICAN AMERICAN: >60
GLOBULIN: 3.2 G/DL
GLUCOSE BLD-MCNC: 97 MG/DL (ref 70–99)
HDLC SERPL-MCNC: 49 MG/DL (ref 40–60)
LDL CHOLESTEROL CALCULATED: 80 MG/DL
POTASSIUM SERPL-SCNC: 4.6 MMOL/L (ref 3.5–5.1)
SODIUM BLD-SCNC: 139 MMOL/L (ref 136–145)
TOTAL PROTEIN: 7.8 G/DL (ref 6.4–8.2)
TRIGL SERPL-MCNC: 168 MG/DL (ref 0–150)
VLDLC SERPL CALC-MCNC: 34 MG/DL

## 2020-02-26 ENCOUNTER — OFFICE VISIT (OUTPATIENT)
Dept: ORTHOPEDIC SURGERY | Age: 65
End: 2020-02-26
Payer: COMMERCIAL

## 2020-02-26 VITALS
TEMPERATURE: 98.3 F | HEART RATE: 73 BPM | SYSTOLIC BLOOD PRESSURE: 140 MMHG | DIASTOLIC BLOOD PRESSURE: 86 MMHG | WEIGHT: 215 LBS | BODY MASS INDEX: 30.1 KG/M2 | HEIGHT: 71 IN

## 2020-02-26 PROCEDURE — 99212 OFFICE O/P EST SF 10 MIN: CPT | Performed by: PHYSICIAN ASSISTANT

## 2020-02-26 NOTE — PROGRESS NOTES
Subjective:      Patient ID: Nabil Quach is a 59 y.o. male. Chief Complaint   Patient presents with    Knee Problem     Right TKA 2.29.2016        HPI: He is here for annual follow up on right knee arthroplasty. The date of procedure- 2/19/2016. No new complaints or issues. There is minimal to no discomfort with ambulation. There is minimal to no discomfort at rest.   Steps can be performed in reciprocal fashion. Review of Systems:   Negative for fever or chills. Past Medical History:   Diagnosis Date    Allergic rhinitis, seasonal     Essential hypertension 1/28/2016    Hypercholesterolemia 8/23/2012       Family History   Problem Relation Age of Onset    Heart Disease Mother 68        Stent    Dementia Mother     Diabetes Father     Heart Disease Father 67        stent       Past Surgical History:   Procedure Laterality Date    BACK SURGERY      CERVICAL SPINE SURGERY      EYE SURGERY      HEMORRHOID SURGERY  1983    JOINT REPLACEMENT      KNEE SURGERY Right 1978    ACL/MCL repair    LASIK      TONSILLECTOMY      TOTAL KNEE ARTHROPLASTY Right 02-19-16    Dr. 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   Tobacco Use    Smoking status: Never Smoker    Smokeless tobacco: Never Used   Substance and Sexual Activity    Alcohol use:  Yes     Alcohol/week: 0.0 standard drinks    Drug use: No    Sexual activity: Never       Current Outpatient Medications   Medication Sig Dispense Refill    metoprolol tartrate (LOPRESSOR) 25 MG tablet TAKE 1 TABLET BY MOUTH 2 TIMES DAILY 180 tablet 3    diphenhydrAMINE HCl (CVS ALLERGY RELIEF ADULT PO) Take by mouth daily      simvastatin (ZOCOR) 20 MG tablet Take 1 tablet by mouth nightly 90 tablet 3    aspirin EC 81 MG EC tablet Take 1 tablet by mouth daily 30 tablet 11    Coenzyme Q10 (COQ10 PO) Take by mouth      Omega-3 Fatty Acids (FISH OIL) 1000 MG CAPS Take 3,000 mg by mouth 3 times daily  Multiple Vitamins-Minerals (THERAPEUTIC MULTIVITAMIN-MINERALS) tablet Take 1 tablet by mouth daily       No current facility-administered medications for this visit. Objective:     He is alert, oriented x 3, pleasant, well nourished, developed and in no acute distress. BP (!) 140/86   Pulse 73   Temp 98.3 °F (36.8 °C) (Temporal)   Ht 5' 11\" (1.803 m)   Wt 215 lb (97.5 kg)   BMI 29.99 kg/m²      KNEE EXAM:  Examination of the right knee shows: There is  no obvious deformity. There is not erythema. There is minimal to no soft tissue swelling. There is no significant joint effusion. AROM-  Extension 0                     Flexion  125  There is no pain associated with ROM testing. Medial joint line is not tender to palpation. Lateral joint line is not tender to palpation. There is not crepitus along the joint line with ROM testing. There is no significant instability with varus or valgus stress testing. Anterior Drawer test is negative for instability. Extensor Mechanism is  intact. X Rays: performed in the office today:   AP, Lateral and Sunrise of the right Knee: There is a right prosthetic total knee arthroplasty present. The alignment is satisfactory. There are no signs of significant failure or loosening. Assessment:       ICD-10-CM    1. Status post total right knee replacement 2/19/16 Z96.651 XR KNEE RIGHT (3 VIEWS)        Plan:     The natural history of the patient's diagnosis as well as the treatment options were discussed in full and questions were answered. Risks and benefits of the treatment options also reviewed in detail. Continue with a HEP-  A home exercise program was re-instructed today including ROM exercises and strengthening exercises. The patient verbalized understanding of these exercises as well as the importance of the exercise program to promote return of normal function.  If pain intensifies or other problems arise you are to notify the

## 2020-04-06 RX ORDER — SIMVASTATIN 20 MG
TABLET ORAL
Qty: 90 TABLET | Refills: 3 | Status: SHIPPED | OUTPATIENT
Start: 2020-04-06 | End: 2021-03-15 | Stop reason: SDUPTHER

## 2021-01-21 ENCOUNTER — OFFICE VISIT (OUTPATIENT)
Dept: PRIMARY CARE CLINIC | Age: 66
End: 2021-01-21
Payer: MEDICARE

## 2021-01-21 VITALS
DIASTOLIC BLOOD PRESSURE: 72 MMHG | TEMPERATURE: 97.2 F | SYSTOLIC BLOOD PRESSURE: 146 MMHG | WEIGHT: 225.6 LBS | HEART RATE: 81 BPM | BODY MASS INDEX: 31.46 KG/M2

## 2021-01-21 DIAGNOSIS — E78.00 HYPERCHOLESTEROLEMIA: ICD-10-CM

## 2021-01-21 DIAGNOSIS — I10 ESSENTIAL HYPERTENSION: ICD-10-CM

## 2021-01-21 DIAGNOSIS — I10 ESSENTIAL HYPERTENSION: Primary | ICD-10-CM

## 2021-01-21 DIAGNOSIS — Z23 NEED FOR VACCINATION: ICD-10-CM

## 2021-01-21 PROCEDURE — 3017F COLORECTAL CA SCREEN DOC REV: CPT | Performed by: FAMILY MEDICINE

## 2021-01-21 PROCEDURE — G8427 DOCREV CUR MEDS BY ELIG CLIN: HCPCS | Performed by: FAMILY MEDICINE

## 2021-01-21 PROCEDURE — G8482 FLU IMMUNIZE ORDER/ADMIN: HCPCS | Performed by: FAMILY MEDICINE

## 2021-01-21 PROCEDURE — G0008 ADMIN INFLUENZA VIRUS VAC: HCPCS | Performed by: FAMILY MEDICINE

## 2021-01-21 PROCEDURE — 1123F ACP DISCUSS/DSCN MKR DOCD: CPT | Performed by: FAMILY MEDICINE

## 2021-01-21 PROCEDURE — 99214 OFFICE O/P EST MOD 30 MIN: CPT | Performed by: FAMILY MEDICINE

## 2021-01-21 PROCEDURE — G0009 ADMIN PNEUMOCOCCAL VACCINE: HCPCS | Performed by: FAMILY MEDICINE

## 2021-01-21 PROCEDURE — 90732 PPSV23 VACC 2 YRS+ SUBQ/IM: CPT | Performed by: FAMILY MEDICINE

## 2021-01-21 PROCEDURE — 1036F TOBACCO NON-USER: CPT | Performed by: FAMILY MEDICINE

## 2021-01-21 PROCEDURE — 4040F PNEUMOC VAC/ADMIN/RCVD: CPT | Performed by: FAMILY MEDICINE

## 2021-01-21 PROCEDURE — 90653 IIV ADJUVANT VACCINE IM: CPT | Performed by: FAMILY MEDICINE

## 2021-01-21 PROCEDURE — G8417 CALC BMI ABV UP PARAM F/U: HCPCS | Performed by: FAMILY MEDICINE

## 2021-01-21 RX ORDER — AMLODIPINE BESYLATE 5 MG/1
5 TABLET ORAL DAILY
Qty: 90 TABLET | Refills: 3 | Status: SHIPPED | OUTPATIENT
Start: 2021-01-21 | End: 2022-01-14

## 2021-01-21 ASSESSMENT — ENCOUNTER SYMPTOMS
VOMITING: 0
SHORTNESS OF BREATH: 0
CONSTIPATION: 0
ABDOMINAL PAIN: 0
DIARRHEA: 0
NAUSEA: 0
COUGH: 0

## 2021-01-21 ASSESSMENT — PATIENT HEALTH QUESTIONNAIRE - PHQ9
SUM OF ALL RESPONSES TO PHQ QUESTIONS 1-9: 0
SUM OF ALL RESPONSES TO PHQ QUESTIONS 1-9: 0
SUM OF ALL RESPONSES TO PHQ9 QUESTIONS 1 & 2: 0

## 2021-01-21 NOTE — PROGRESS NOTES
Chief Complaint   Patient presents with    Hyperlipidemia    Hypertension       HPI: Sandor Lees presents for evaluation and management of multiple medical problems. He reports his father  in April from a fall with a hip fracture and because of the Covid restrictions he was not able to visit him in his last days. He is upset about that. He notes he is taking tolerating his cholesterol medicine without abdominal pain or myalgia. He has been taking his Lopressor for his blood pressure and notes no lightheadedness or dizziness. He has gained a little bit of weight. Review of Systems   Constitutional: Negative for chills and fever. Respiratory: Negative for cough and shortness of breath. Cardiovascular: Negative for chest pain and palpitations. Gastrointestinal: Negative for abdominal pain, constipation, diarrhea, nausea and vomiting. Psychiatric/Behavioral: Negative for dysphoric mood and sleep disturbance. No Known Allergies  New Prescriptions    AMLODIPINE (NORVASC) 5 MG TABLET    Take 1 tablet by mouth daily     Current Outpatient Medications   Medication Sig Dispense Refill    metoprolol tartrate (LOPRESSOR) 25 MG tablet TAKE 1 TABLET BY MOUTH 2 TIMES DAILY 180 tablet 3    amLODIPine (NORVASC) 5 MG tablet Take 1 tablet by mouth daily 90 tablet 3    simvastatin (ZOCOR) 20 MG tablet TAKE 1 TABLET BY MOUTH EVERY DAY AT NIGHT 90 tablet 3    diphenhydrAMINE HCl (CVS ALLERGY RELIEF ADULT PO) Take by mouth daily      aspirin EC 81 MG EC tablet Take 1 tablet by mouth daily 30 tablet 11    Coenzyme Q10 (COQ10 PO) Take by mouth      Omega-3 Fatty Acids (FISH OIL) 1000 MG CAPS Take 3,000 mg by mouth 3 times daily      Multiple Vitamins-Minerals (THERAPEUTIC MULTIVITAMIN-MINERALS) tablet Take 1 tablet by mouth daily       No current facility-administered medications for this visit.         Past Medical History:   Diagnosis Date    Allergic rhinitis, seasonal Component Value Date    LDLCALC 80 01/15/2020    LDLCALC 100 (H) 01/03/2019    LDLCALC 80 11/20/2017     Lab Results   Component Value Date    LABVLDL 34 01/15/2020    LABVLDL 40 01/03/2019    LABVLDL 57 11/20/2017         Assessment   Plan     1. Essential hypertension  Uncontrolled: We will add amlodipine and recheck in 6 weeks. Counseled on risks and benefits of the medication  - Comprehensive Metabolic Panel; Future  - metoprolol tartrate (LOPRESSOR) 25 MG tablet; TAKE 1 TABLET BY MOUTH 2 TIMES DAILY  Dispense: 180 tablet; Refill: 3  - amLODIPine (NORVASC) 5 MG tablet; Take 1 tablet by mouth daily  Dispense: 90 tablet; Refill: 3    2. Hypercholesterolemia  Historically controlled. We will recheck labs today and continue meds  - Lipid Panel; Future  - Comprehensive Metabolic Panel; Future    3. Need for vaccination  Vaccinated  - Pneumococcal polysaccharide vaccine 23-valent greater than or equal to 1yo subcutaneous/IM  - INFLUENZA, TRIV, INACTIVATED, SUBUNIT, ADJUVANTED, 65 YRS AND OLDER, IM, PREFILL SYR, 0.5ML (FLUAD TRIV)  Discussed use, benefit, and side effects of prescribed medications. Barriers to medication compliance addressed. All patient questions answered. Pt voiced understanding.        Health Maintenance   Topic Date Due    HIV screen  10/08/1970    Potassium monitoring  01/15/2021    Creatinine monitoring  01/15/2021    Lipid screen  01/15/2021    DTaP/Tdap/Td vaccine (2 - Td) 08/23/2022    Colon cancer screen colonoscopy  01/03/2028    Flu vaccine  Completed    Shingles Vaccine  Completed    Pneumococcal 65+ years Vaccine  Completed    Hepatitis C screen  Completed    Hepatitis A vaccine  Aged Out    Hepatitis B vaccine  Aged Out    Hib vaccine  Aged Out    Meningococcal (ACWY) vaccine  Aged Out       RTC 6 weeks and as needed

## 2021-01-22 LAB
A/G RATIO: 1.7 (ref 1.1–2.2)
ALBUMIN SERPL-MCNC: 4.8 G/DL (ref 3.4–5)
ALP BLD-CCNC: 68 U/L (ref 40–129)
ALT SERPL-CCNC: 35 U/L (ref 10–40)
ANION GAP SERPL CALCULATED.3IONS-SCNC: 11 MMOL/L (ref 3–16)
AST SERPL-CCNC: 28 U/L (ref 15–37)
BILIRUB SERPL-MCNC: 0.3 MG/DL (ref 0–1)
BUN BLDV-MCNC: 12 MG/DL (ref 7–20)
CALCIUM SERPL-MCNC: 9.5 MG/DL (ref 8.3–10.6)
CHLORIDE BLD-SCNC: 103 MMOL/L (ref 99–110)
CHOLESTEROL, TOTAL: 152 MG/DL (ref 0–199)
CO2: 26 MMOL/L (ref 21–32)
CREAT SERPL-MCNC: 0.8 MG/DL (ref 0.8–1.3)
GFR AFRICAN AMERICAN: >60
GFR NON-AFRICAN AMERICAN: >60
GLOBULIN: 2.8 G/DL
GLUCOSE BLD-MCNC: 88 MG/DL (ref 70–99)
HDLC SERPL-MCNC: 58 MG/DL (ref 40–60)
LDL CHOLESTEROL CALCULATED: 71 MG/DL
POTASSIUM SERPL-SCNC: 4.5 MMOL/L (ref 3.5–5.1)
SODIUM BLD-SCNC: 140 MMOL/L (ref 136–145)
TOTAL PROTEIN: 7.6 G/DL (ref 6.4–8.2)
TRIGL SERPL-MCNC: 117 MG/DL (ref 0–150)
VLDLC SERPL CALC-MCNC: 23 MG/DL

## 2021-02-23 ENCOUNTER — OFFICE VISIT (OUTPATIENT)
Dept: ORTHOPEDIC SURGERY | Age: 66
End: 2021-02-23
Payer: MEDICARE

## 2021-02-23 VITALS — TEMPERATURE: 97.7 F

## 2021-02-23 DIAGNOSIS — Z96.651 STATUS POST TOTAL RIGHT KNEE REPLACEMENT: Primary | ICD-10-CM

## 2021-02-23 PROCEDURE — 1036F TOBACCO NON-USER: CPT | Performed by: PHYSICIAN ASSISTANT

## 2021-02-23 PROCEDURE — 4040F PNEUMOC VAC/ADMIN/RCVD: CPT | Performed by: PHYSICIAN ASSISTANT

## 2021-02-23 PROCEDURE — G8417 CALC BMI ABV UP PARAM F/U: HCPCS | Performed by: PHYSICIAN ASSISTANT

## 2021-02-23 PROCEDURE — G8482 FLU IMMUNIZE ORDER/ADMIN: HCPCS | Performed by: PHYSICIAN ASSISTANT

## 2021-02-23 PROCEDURE — G8427 DOCREV CUR MEDS BY ELIG CLIN: HCPCS | Performed by: PHYSICIAN ASSISTANT

## 2021-02-23 PROCEDURE — 3017F COLORECTAL CA SCREEN DOC REV: CPT | Performed by: PHYSICIAN ASSISTANT

## 2021-02-23 PROCEDURE — 99212 OFFICE O/P EST SF 10 MIN: CPT | Performed by: PHYSICIAN ASSISTANT

## 2021-02-23 PROCEDURE — 1123F ACP DISCUSS/DSCN MKR DOCD: CPT | Performed by: PHYSICIAN ASSISTANT

## 2021-02-23 NOTE — PROGRESS NOTES
Subjective:      Patient ID: Makenna Castillo is a 72 y.o.  male. Chief Complaint   Patient presents with    Post-Op Check     s/p right total knee replacement 2/19/16        HPI:  Here for annual follow up visit. S/P right knee arthroplasty. The date of procedure- 2/2016. Surgeon: Pepe Goldstein   Issues or complaints: none      Review of Systems:   A 14 point review of systems and history form completed by the patient has been reviewed. This form is scanned in the media tab of the patient's chart under today's date. Past Medical History:   Diagnosis Date    Allergic rhinitis, seasonal     Essential hypertension 1/28/2016    Hypercholesterolemia 8/23/2012       Family History   Problem Relation Age of Onset    Heart Disease Mother 68        Stent    Dementia Mother     Diabetes Father     Heart Disease Father 67        stent       Past Surgical History:   Procedure Laterality Date    BACK SURGERY      CERVICAL SPINE SURGERY      EYE SURGERY      HEMORRHOID SURGERY  1983    JOINT REPLACEMENT      KNEE SURGERY Right 1978    ACL/MCL repair    LASIK      TONSILLECTOMY      TOTAL KNEE ARTHROPLASTY Right 02-19-16    Dr. 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   Tobacco Use    Smoking status: Never Smoker    Smokeless tobacco: Never Used   Substance and Sexual Activity    Alcohol use:  Yes     Alcohol/week: 0.0 standard drinks    Drug use: No    Sexual activity: Never       Current Outpatient Medications   Medication Sig Dispense Refill    metoprolol tartrate (LOPRESSOR) 25 MG tablet TAKE 1 TABLET BY MOUTH 2 TIMES DAILY 180 tablet 3    amLODIPine (NORVASC) 5 MG tablet Take 1 tablet by mouth daily 90 tablet 3    simvastatin (ZOCOR) 20 MG tablet TAKE 1 TABLET BY MOUTH EVERY DAY AT NIGHT 90 tablet 3    diphenhydrAMINE HCl (CVS ALLERGY RELIEF ADULT PO) Take by mouth daily      aspirin EC 81 MG EC tablet Take 1 tablet by mouth daily 30 tablet 11  Coenzyme Q10 (COQ10 PO) Take by mouth      Omega-3 Fatty Acids (FISH OIL) 1000 MG CAPS Take 3,000 mg by mouth 3 times daily      Multiple Vitamins-Minerals (THERAPEUTIC MULTIVITAMIN-MINERALS) tablet Take 1 tablet by mouth daily       No current facility-administered medications for this visit. Objective:   He is alert, oriented x 3, pleasant, well nourished, developed and in no acute distress. Temp 97.7 °F (36.5 °C) (Temporal)      Examination of the right knee: Inspection of the skin demonstrates a well-healed midline incision. Inspection of the soft tissues without significant swelling or erythema. The overall alignment of the knee is neutral.  There is minimal intra-articular effusion. AROM     Extension 0     Flexion  125   There no pain associated with ROM testing. Pes anserine bursa non-tender to palpation. Patellar tendon non-tender to palpation. Quadriceps tendon non-tender to palpation. Collateral ligaments non-tender to palpation. Popliteal fossa non-tender to palpation. Retro patellar crepitus is not present. There is no instability with varus/valgus stress testing in full extension or 90 degrees of flexion. There is no instability with anterior drawer testing. Extensor Mechanism is intact. X Rays: was performed in the office today:   AP Standing, Lateral and Sunrise Right Knee: There is a right cemented total knee arthroplasty present. The alignment is satisfactory. There are no signs of failure or loosening. Diagnosis:        ICD-10-CM    1. Status post total right knee replacement 2/19/16  Z96.651 XR KNEE RIGHT (3 VIEWS)          Assessment/ Plan:     Assessment:    Clinically and radiographically stable right knee arthroplasty 5 years post op. 10 minutes was the total time spent on today's visit reviewing test results or histories in preparation for the visit, time spent on documentation, ordering prescriptions, tests, procedures after the visit. Plan:  1. Medications-discussed need for prophylactic antibiotics for dental or surgical procedures. 2. PT- A home exercise program was reviewed today including ROM exercises and strengthening exercises. The patient verbalized understanding of these exercises as well as the importance of the exercise program to promote normal function. 3. Activity restrictions discussed. Avoid running and jumping activities. 4. Further Imaging- none. 5. Procedures- none. 6. Follow up- 1 year. 7. Call or return to clinic if these symptoms worsen or fail to improve as anticipated.

## 2021-03-09 PROCEDURE — 91300 COVID-19, PFIZER VACCINE 30MCG/0.3ML DOSE: CPT | Performed by: FAMILY MEDICINE

## 2021-03-09 PROCEDURE — 0001A COVID-19, PFIZER VACCINE 30MCG/0.3ML DOSE: CPT | Performed by: FAMILY MEDICINE

## 2021-03-15 ENCOUNTER — NURSE ONLY (OUTPATIENT)
Dept: PRIMARY CARE CLINIC | Age: 66
End: 2021-03-15
Payer: MEDICARE

## 2021-03-15 ENCOUNTER — OFFICE VISIT (OUTPATIENT)
Dept: PRIMARY CARE CLINIC | Age: 66
End: 2021-03-15
Payer: MEDICARE

## 2021-03-15 VITALS
SYSTOLIC BLOOD PRESSURE: 133 MMHG | HEART RATE: 73 BPM | TEMPERATURE: 97.4 F | DIASTOLIC BLOOD PRESSURE: 78 MMHG | HEIGHT: 68 IN | BODY MASS INDEX: 33.98 KG/M2 | WEIGHT: 224.2 LBS

## 2021-03-15 DIAGNOSIS — I10 ESSENTIAL HYPERTENSION: ICD-10-CM

## 2021-03-15 DIAGNOSIS — Z00.00 ROUTINE GENERAL MEDICAL EXAMINATION AT A HEALTH CARE FACILITY: ICD-10-CM

## 2021-03-15 DIAGNOSIS — Z23 HIGH PRIORITY FOR COVID-19 VIRUS VACCINATION: Primary | ICD-10-CM

## 2021-03-15 DIAGNOSIS — K06.9 GINGIVAL DISEASE DUE TO LICHEN PLANUS: ICD-10-CM

## 2021-03-15 DIAGNOSIS — Z00.00 MEDICARE ANNUAL WELLNESS VISIT, INITIAL: Primary | ICD-10-CM

## 2021-03-15 DIAGNOSIS — L43.9 GINGIVAL DISEASE DUE TO LICHEN PLANUS: ICD-10-CM

## 2021-03-15 DIAGNOSIS — E78.00 HYPERCHOLESTEROLEMIA: ICD-10-CM

## 2021-03-15 PROCEDURE — 4040F PNEUMOC VAC/ADMIN/RCVD: CPT | Performed by: FAMILY MEDICINE

## 2021-03-15 PROCEDURE — 99213 OFFICE O/P EST LOW 20 MIN: CPT | Performed by: FAMILY MEDICINE

## 2021-03-15 PROCEDURE — G8482 FLU IMMUNIZE ORDER/ADMIN: HCPCS | Performed by: FAMILY MEDICINE

## 2021-03-15 PROCEDURE — G8427 DOCREV CUR MEDS BY ELIG CLIN: HCPCS | Performed by: FAMILY MEDICINE

## 2021-03-15 PROCEDURE — G8417 CALC BMI ABV UP PARAM F/U: HCPCS | Performed by: FAMILY MEDICINE

## 2021-03-15 PROCEDURE — 1036F TOBACCO NON-USER: CPT | Performed by: FAMILY MEDICINE

## 2021-03-15 PROCEDURE — 3017F COLORECTAL CA SCREEN DOC REV: CPT | Performed by: FAMILY MEDICINE

## 2021-03-15 PROCEDURE — G0402 INITIAL PREVENTIVE EXAM: HCPCS | Performed by: FAMILY MEDICINE

## 2021-03-15 PROCEDURE — 1123F ACP DISCUSS/DSCN MKR DOCD: CPT | Performed by: FAMILY MEDICINE

## 2021-03-15 RX ORDER — SIMVASTATIN 20 MG
TABLET ORAL
Qty: 90 TABLET | Refills: 3 | Status: SHIPPED | OUTPATIENT
Start: 2021-03-15 | End: 2022-03-21 | Stop reason: SDUPTHER

## 2021-03-15 ASSESSMENT — ENCOUNTER SYMPTOMS
ABDOMINAL PAIN: 0
SORE THROAT: 0
RHINORRHEA: 0
NAUSEA: 0
COUGH: 0
EYE PAIN: 0
CONSTIPATION: 0
DIARRHEA: 0
SHORTNESS OF BREATH: 0
VOMITING: 0
COLOR CHANGE: 0

## 2021-03-15 ASSESSMENT — PATIENT HEALTH QUESTIONNAIRE - PHQ9
2. FEELING DOWN, DEPRESSED OR HOPELESS: 0
SUM OF ALL RESPONSES TO PHQ9 QUESTIONS 1 & 2: 0

## 2021-03-15 NOTE — PROGRESS NOTES
Chief Complaint   Patient presents with    Medicare AWV     Annual Wellness Visit    Hypertension    Hyperlipidemia       HPI: Carlo Baird presents for evaluation and management of welcome to Medicare physical and follow-up on hypertension and cholesterol. Yasmin Gibbs [address] notes that he is exercising some. He ordered a recumbent bicycle but mostly is walking and golfing at this time. He has had no falls in the last year. Reports his memory is good. He denies any difficulty with his hearing. He notes he is having some blurred vision and worsening vision over the last couple years. He is status post LASIK surgery at [place].    He also notes that when he went to the dentist most recently they noted a white layer on his gums. He is taking and tolerating his blood pressure medicine without lightheadedness or dizziness. He is taking and tolerating his cholesterol without myalgia or abdominal pain. PHQ-9 Total Score: 0 (3/15/2021 10:19 AM)          Review of Systems   Constitutional: Negative for chills and fever. HENT: Negative for ear pain, rhinorrhea and sore throat. Eyes: Positive for visual disturbance. Negative for pain. Respiratory: Negative for cough and shortness of breath. Cardiovascular: Negative for chest pain and palpitations. Gastrointestinal: Negative for abdominal pain, constipation, diarrhea, nausea and vomiting. Genitourinary: Negative for dysuria and frequency. Musculoskeletal: Negative for joint swelling and myalgias. Skin: Negative for color change and rash. Neurological: Negative for weakness, numbness and headaches. Hematological: Negative for adenopathy. Does not bruise/bleed easily. Psychiatric/Behavioral: Negative for dysphoric mood, self-injury and suicidal ideas. The patient is not nervous/anxious.         No Known Allergies  New Prescriptions    No medications on file     Current Outpatient Medications   Medication Sig Dispense Refill    metoprolol tartrate (LOPRESSOR) 25 MG tablet TAKE 1 TABLET BY MOUTH 2 TIMES DAILY 180 tablet 3    amLODIPine (NORVASC) 5 MG tablet Take 1 tablet by mouth daily 90 tablet 3    simvastatin (ZOCOR) 20 MG tablet TAKE 1 TABLET BY MOUTH EVERY DAY AT NIGHT 90 tablet 3    diphenhydrAMINE HCl (CVS ALLERGY RELIEF ADULT PO) Take by mouth daily      aspirin EC 81 MG EC tablet Take 1 tablet by mouth daily 30 tablet 11    Coenzyme Q10 (COQ10 PO) Take by mouth      Omega-3 Fatty Acids (FISH OIL) 1000 MG CAPS Take 3,000 mg by mouth 3 times daily      Multiple Vitamins-Minerals (THERAPEUTIC MULTIVITAMIN-MINERALS) tablet Take 1 tablet by mouth daily       No current facility-administered medications for this visit. Past Medical History:   Diagnosis Date    Allergic rhinitis, seasonal     Essential hypertension 1/28/2016    Hypercholesterolemia 8/23/2012         Objective   /78   Pulse 73   Temp 97.4 °F (36.3 °C) (Temporal)   Ht 5' 8\" (1.727 m)   Wt 224 lb 3.2 oz (101.7 kg)   BMI 34.09 kg/m²   Wt Readings from Last 3 Encounters:   03/15/21 224 lb 3.2 oz (101.7 kg)   01/21/21 225 lb 9.6 oz (102.3 kg)   02/26/20 215 lb (97.5 kg)       Physical Exam  Constitutional:       Appearance: He is well-developed. HENT:      Head: Normocephalic and atraumatic. Nose: Nose normal.      Mouth/Throat:      Pharynx: No oropharyngeal exudate. Comments: White macular lesion on lower gums bilaterally. Some atrophy of gumline  Eyes:      General: No scleral icterus. Right eye: No discharge. Left eye: No discharge. Pupils: Pupils are equal, round, and reactive to light. Neck:      Musculoskeletal: Normal range of motion and neck supple. Thyroid: No thyromegaly. Cardiovascular:      Rate and Rhythm: Normal rate and regular rhythm. Pulses:           Dorsalis pedis pulses are 2+ on the right side and 2+ on the left side. Posterior tibial pulses are 2+ on the right side and 2+ on the left side. vaccine  Aged Out    Hepatitis B vaccine  Aged Out    Hib vaccine  Aged Out    Meningococcal (ACWY) vaccine  Aged Out       RTC 6 months and as needed  Medicare Annual Wellness Visit  Name: Nicholas Hunt Date: 3/15/2021   MRN: <[id number]> Sex: Male   Age: 72 y.o. Ethnicity: Non-/Non    : 1955 Race: Cade Tierney is here for [employer] (Annual Wellness Visit), Hypertension, and Hyperlipidemia    Screenings for behavioral, psychosocial and functional/safety risks, and cognitive dysfunction are all negative except as indicated below. These results, as well as other patient data from the [address] form, are documented in Flowsheets linked to this Encounter. No Known Allergies    Prior to Visit Medications    Medication Sig Taking?  Authorizing Provider   metoprolol tartrate (LOPRESSOR) 25 MG tablet TAKE 1 TABLET BY MOUTH 2 TIMES DAILY Yes Malik Antunez MD   amLODIPine (NORVASC) 5 MG tablet Take 1 tablet by mouth daily Yes Malik Antunez MD   simvastatin (ZOCOR) 20 MG tablet TAKE 1 TABLET BY MOUTH EVERY DAY AT NIGHT Yes Malik Antunez MD   diphenhydrAMINE HCl (CVS ALLERGY RELIEF ADULT PO) Take by mouth daily Yes Historical Provider, MD   aspirin EC 81 MG EC tablet Take 1 tablet by mouth daily Yes Malik Antunez MD   Coenzyme Q10 (COQ10 PO) Take by mouth Yes Historical Provider, MD   Omega-3 Fatty Acids (FISH OIL) 1000 MG CAPS Take 3,000 mg by mouth 3 times daily Yes Historical Provider, MD   Multiple Vitamins-Minerals (THERAPEUTIC MULTIVITAMIN-MINERALS) tablet Take 1 tablet by mouth daily Yes Historical Provider, MD       Past Medical History:   Diagnosis Date    Allergic rhinitis, seasonal     Essential hypertension 2016    Hypercholesterolemia 2012       Past Surgical History:   Procedure Laterality Date    BACK SURGERY      CERVICAL [address]    JOINT REPLACEMENT      KNEE SURGERY (6 mo and older Fluzone, Flulaval, Fluarix, and 3 yrs and older Afluria) 11/20/2017, 01/03/2019, 11/13/2019    Influenza, Triv, inactivated, subunit, adjuvanted, IM (Fluad 65 yrs and older) 01/21/2021    Pneumococcal Polysaccharide ([id number]) 01/21/2021    Tdap (Boostrix, Adacel) 08/23/2012    Zoster Recombinant (Shingrix) 01/08/2019, 11/06/2019, 01/08/2020        Health Maintenance   Topic Date Due    HIV screen  Never done    COVID-19 Vaccine (1) Never done   [employer] Visit (AWV)  Never done    Lipid screen  01/21/2022    Potassium monitoring  01/21/2022    Creatinine monitoring  01/21/2022    DTaP/Tdap/Td vaccine (2 - Td) 08/23/2022    Colon cancer screen colonoscopy  01/03/2028    Flu vaccine  Completed    Shingles Vaccine  Completed    Pneumococcal 65+ years Vaccine  Completed    Hepatitis C screen  Completed    Hepatitis A vaccine  Aged Out    Hepatitis B vaccine  Aged Out    Hib vaccine  Aged Out    Meningococcal (ACWY) vaccine  Aged Out     Recommendations for [employer] Due: see orders and patient instructions/AVS.  . Recommended screening schedule for the next 5-10 years is provided to the patient in written form: see Patient Instructions/AVS.    Yolande Valente was seen today for medicare awv, hypertension and hyperlipidemia. 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 Also discussed the patients long-term treatment options.  Reviewed with the patient the [facility] [place], [facility] of [facility] Will Declaration forms  Reviewed the process of designating a competent adult as an

## 2021-03-15 NOTE — PATIENT INSTRUCTIONS
Follow up with eye doctor    Follow up with periodontist.    Set up your [address]  and send in to us. Fax [phone]      Personalized Preventive Plan for Deepthi Mejía - 3/15/2021  Medicare offers a range of preventive health benefits. Some of the tests and screenings are paid in full while other may be subject to a deductible, co-insurance, and/or copay. Some of these benefits include a comprehensive review of your medical history including lifestyle, illnesses that may run in your family, and various assessments and screenings as appropriate. After reviewing your medical record and screening and assessments performed today your provider may have ordered immunizations, labs, imaging, and/or referrals for you. A list of these orders (if applicable) as well as your Preventive Care list are included within your After Visit Summary for your review. Other Preventive Recommendations:    · A preventive eye exam performed by an eye specialist is recommended every 1-2 years to screen for glaucoma; cataracts, macular degeneration, and other eye disorders. · A preventive dental visit is recommended every 6 months. · Try to get at least 150 minutes of exercise per week or 10,000 steps per day on a pedometer . · Order or download the FREE \"Exercise & Physical Activity: Your Everyday Guide\" from [employer]. Call 0-231.269.9246 or search [employer] online. · You need 7248-3499 mg of calcium and 3937-0490 IU of vitamin D per day. It is possible to meet your calcium requirement with diet alone, but a vitamin D supplement is usually necessary to meet this goal.  · When exposed to the sun, use a sunscreen that protects against both UVA and UVB radiation with an SPF of 30 or greater. Reapply every 2 to 3 hours or after sweating, drying off with a towel, or swimming. · Always wear a seat belt when traveling in a car.  Always wear a helmet when riding a bicycle or motorcycle.

## 2021-03-30 PROCEDURE — 91300 COVID-19, PFIZER VACCINE 30MCG/0.3ML DOSE: CPT | Performed by: NURSE PRACTITIONER

## 2021-03-31 ENCOUNTER — NURSE ONLY (OUTPATIENT)
Dept: PRIMARY CARE CLINIC | Age: 66
End: 2021-03-31
Payer: MEDICARE

## 2021-03-31 DIAGNOSIS — Z23 HIGH PRIORITY FOR COVID-19 VIRUS VACCINATION: Primary | ICD-10-CM

## 2021-03-31 PROCEDURE — 0002A PR IMM ADMN SARSCOV2 30MCG/0.3ML DIL RECON 2ND DOSE: CPT | Performed by: NURSE PRACTITIONER

## 2021-08-28 NOTE — PROGRESS NOTES
PHQ-9 Total Score: 0 (1/21/2021  1:52 PM)            CARDIOLOGY     PROGRESS  NOTE   ________________________________________________    CHIEF COMPLAINT:Patient is a 75y old  Male who presents with a chief complaint of sob (28 Aug 2021 09:38)  no complain.  	  REVIEW OF SYSTEMS:  CONSTITUTIONAL: No fever, weight loss, or fatigue  EYES: No eye pain, visual disturbances, or discharge  ENT:  No difficulty hearing, tinnitus, vertigo; No sinus or throat pain  NECK: No pain or stiffness  RESPIRATORY: No cough, wheezing, chills or hemoptysis; No Shortness of Breath  CARDIOVASCULAR: No chest pain, palpitations, passing out, dizziness, or leg swelling  GASTROINTESTINAL: No abdominal or epigastric pain. No nausea, vomiting, or hematemesis; No diarrhea or constipation. No melena or hematochezia.  GENITOURINARY: No dysuria, frequency, hematuria, or incontinence  NEUROLOGICAL: No headaches, memory loss, loss of strength, numbness, or tremors  SKIN: No itching, burning, rashes, or lesions   LYMPH Nodes: No enlarged glands  ENDOCRINE: No heat or cold intolerance; No hair loss  MUSCULOSKELETAL: No joint pain or swelling; No muscle, back, or extremity pain  PSYCHIATRIC: No depression, anxiety, mood swings, or difficulty sleeping  HEME/LYMPH: No easy bruising, or bleeding gums  ALLERGY AND IMMUNOLOGIC: No hives or eczema	    [ ] All others negative	  [ ] Unable to obtain    PHYSICAL EXAM:  T(C): 36.6 (08-28-21 @ 08:30), Max: 36.8 (08-27-21 @ 21:36)  HR: 59 (08-28-21 @ 08:30) (58 - 76)  BP: 126/72 (08-28-21 @ 08:30) (120/73 - 147/68)  RR: 18 (08-28-21 @ 08:30) (18 - 18)  SpO2: 95% (08-28-21 @ 08:30) (94% - 95%)  Wt(kg): --  I&O's Summary    27 Aug 2021 07:01  -  28 Aug 2021 07:00  --------------------------------------------------------  IN: 240 mL / OUT: 450 mL / NET: -210 mL        Appearance: Normal	  HEENT:   Normal oral mucosa, PERRL, EOMI	  Lymphatic: No lymphadenopathy  Cardiovascular: Normal S1 S2, No JVD, + murmurs, No edema  Respiratory: Lungs clear to auscultation	  Psychiatry: A & O x 3, Mood & affect appropriate  Gastrointestinal:  Soft, Non-tender, + BS	  Skin: No rashes, No ecchymoses, No cyanosis	  Neurologic: Non-focal  Extremities: Normal range of motion, No clubbing, cyanosis or edema  Vascular: Peripheral pulses palpable 2+ bilaterally    MEDICATIONS  (STANDING):  aspirin enteric coated 81 milliGRAM(s) Oral daily  atorvastatin 40 milliGRAM(s) Oral at bedtime  chlorhexidine 2% Cloths 1 Application(s) Topical daily  clotrimazole 1% Cream 1 Application(s) Topical two times a day  furosemide    Tablet 40 milliGRAM(s) Oral daily  heparin   Injectable 5000 Unit(s) SubCutaneous every 12 hours  levothyroxine 137 MICROGram(s) Oral daily  metoprolol succinate ER 50 milliGRAM(s) Oral daily  nystatin Powder 1 Application(s) Topical three times a day  potassium chloride    Tablet ER 20 milliEquivalent(s) Oral daily      TELEMETRY: 	    ECG:  	  RADIOLOGY:  OTHER: 	  	  LABS:	 	    CARDIAC MARKERS:                                11.5   4.83  )-----------( 139      ( 27 Aug 2021 06:50 )             35.4     08-27    138  |  104  |  48<H>  ----------------------------<  96  4.2   |  21<L>  |  1.28    Ca    9.6      27 Aug 2021 06:49      proBNP: Serum Pro-Brain Natriuretic Peptide: 1881 pg/mL (08-18 @ 16:10)    Lipid Profile:   HgA1c:   TSH: Thyroid Stimulating Hormone, Serum: 5.02 uIU/mL (08-25 @ 22:25)          Assessment and plan  ---------------------------  74 yo  h/o  HT PPM, CAD.  LYMPHEDEMA. HTN  pw low back pain and SOB.      Patient states his low back pain began 5 days ago. He denies any trauma or falls. States the pain was across his entire lower back. Denies  leg   numbness, tingling, leg weakness, urinary or bowl incontinence.      He states the pain has improved but states he is still using his walker to walk instead of his cane.     He states of  the past 4 days he has also noticed DUNCAN. States he is only able to walk short distances with becoming dyspnea  and has  leg swelling.     He denies fever, chills, cough, chest pain, orthopnea, PND.      He states he did not take his lasix for the past few days because it makes him urinate too much.   pt is well known to me with hx of MR, htn, cad, a.fib on no ac ?sec to frequent falls, pvd on pletal, s/p ppm.  chf ?sec to non compliant to meds  decrease iv lasix  repeat echo to check MV  check ppm for any a.fib, if not Ac candidate will consider ?watchman  dvt prophylaxis  fu lytes  MR needs to be addressed, will consider ERENDIRA  continue with lasix,  increase losartan/ fu renal function  change lasix to po  ERENDIRA noted, severe MT not clip structure  R/L heart cath  with 2 vessele disease, LAD/LCX  needs CABG/MVR ct surgery called schedule on Tuesday  check carotid doppler  hold losartan prior to surgery sec to increase creatnine  will call to interrogate the ppm today  renal function is improving, check lytes tomorrow

## 2021-09-15 ENCOUNTER — OFFICE VISIT (OUTPATIENT)
Dept: PRIMARY CARE CLINIC | Age: 66
End: 2021-09-15
Payer: MEDICARE

## 2021-09-15 VITALS
BODY MASS INDEX: 33.07 KG/M2 | SYSTOLIC BLOOD PRESSURE: 132 MMHG | WEIGHT: 218.2 LBS | DIASTOLIC BLOOD PRESSURE: 70 MMHG | HEIGHT: 68 IN | HEART RATE: 70 BPM

## 2021-09-15 DIAGNOSIS — E78.00 HYPERCHOLESTEROLEMIA: Primary | ICD-10-CM

## 2021-09-15 DIAGNOSIS — I10 ESSENTIAL HYPERTENSION: ICD-10-CM

## 2021-09-15 PROCEDURE — 3017F COLORECTAL CA SCREEN DOC REV: CPT | Performed by: FAMILY MEDICINE

## 2021-09-15 PROCEDURE — 4040F PNEUMOC VAC/ADMIN/RCVD: CPT | Performed by: FAMILY MEDICINE

## 2021-09-15 PROCEDURE — G8427 DOCREV CUR MEDS BY ELIG CLIN: HCPCS | Performed by: FAMILY MEDICINE

## 2021-09-15 PROCEDURE — 99213 OFFICE O/P EST LOW 20 MIN: CPT | Performed by: FAMILY MEDICINE

## 2021-09-15 PROCEDURE — 90694 VACC AIIV4 NO PRSRV 0.5ML IM: CPT | Performed by: FAMILY MEDICINE

## 2021-09-15 PROCEDURE — 1123F ACP DISCUSS/DSCN MKR DOCD: CPT | Performed by: FAMILY MEDICINE

## 2021-09-15 PROCEDURE — G0008 ADMIN INFLUENZA VIRUS VAC: HCPCS | Performed by: FAMILY MEDICINE

## 2021-09-15 PROCEDURE — 1036F TOBACCO NON-USER: CPT | Performed by: FAMILY MEDICINE

## 2021-09-15 PROCEDURE — G8417 CALC BMI ABV UP PARAM F/U: HCPCS | Performed by: FAMILY MEDICINE

## 2021-09-15 NOTE — PROGRESS NOTES
Chief Complaint   Patient presents with    Hypertension    Hyperlipidemia       HPI: Ale Linn presents for evaluation and management of hypertension and cholesterol. Yasmin Hernesto Reynoso 226 notes he is taking and tolerating his blood pressure medicine without lightheadedness or dizziness. He reports he is taking tolerating his cholesterol medicine without abdominal pain or myalgia. He has been working aggressively on his weight. He is spinning on the cycle for 30 minutes daily burning up about 300 ameya. He is working on his diet and has managed to affect 6 pound weight loss in the last 6 months         Review of Systems    No Known Allergies  New Prescriptions    No medications on file     Current Outpatient Medications   Medication Sig Dispense Refill    simvastatin (ZOCOR) 20 MG tablet TAKE 1 TABLET BY MOUTH EVERY DAY AT NIGHT 90 tablet 3    metoprolol tartrate (LOPRESSOR) 25 MG tablet TAKE 1 TABLET BY MOUTH 2 TIMES DAILY 180 tablet 3    amLODIPine (NORVASC) 5 MG tablet Take 1 tablet by mouth daily 90 tablet 3    diphenhydrAMINE HCl ([employer] ALLERGY RELIEF ADULT PO) Take by mouth daily      aspirin EC 81 MG EC tablet Take 1 tablet by mouth daily 30 tablet 11    Coenzyme Q10 (COQ10 PO) Take by mouth      Omega-3 Fatty Acids (FISH OIL) 1000 MG CAPS Take 3,000 mg by mouth 3 times daily      Multiple Vitamins-Minerals (THERAPEUTIC MULTIVITAMIN-MINERALS) tablet Take 1 tablet by mouth daily       No current facility-administered medications for this visit.        Past Medical History:   Diagnosis Date    Allergic rhinitis, seasonal     Essential hypertension 1/28/2016    Hypercholesterolemia 8/23/2012         Objective   /70   Pulse 70   Ht 5' 8\" (1.727 m)   Wt 218 lb 3.2 oz (99 kg)   BMI 33.18 kg/m²   Wt Readings from Last 3 Encounters:   09/15/21 218 lb 3.2 oz (99 kg)   03/15/21 224 lb 3.2 oz (101.7 kg)   01/21/21 225 lb 9.6 oz (102.3 kg)       Physical Exam  Constitutional:       Appearance: He is well-developed. Cardiovascular:      Rate and Rhythm: Normal rate and regular rhythm. Heart sounds: No murmur heard. No friction rub. No gallop. Pulmonary:      Effort: Pulmonary effort is normal.      Breath sounds: Normal breath sounds. No wheezing or rales. Abdominal:      General: Bowel sounds are normal. There is no distension. Palpations: Abdomen is soft. There is no mass. Tenderness: There is no abdominal tenderness. Skin:     General: Skin is warm and dry. Findings: No rash. Chemistry        Component Value Date/Time     01/21/2021 1443    K 4.5 01/21/2021 1443     01/21/2021 1443    CO2 26 01/21/2021 1443    BUN 12 01/21/2021 1443    CREATININE 0.8 01/21/2021 1443        Component Value Date/Time    CALCIUM 9.5 01/21/2021 1443    ALKPHOS 68 01/21/2021 1443    AST 28 01/21/2021 1443    ALT 35 01/21/2021 1443    BILITOT 0.3 01/21/2021 1443          Lab Results   Component Value Date    WBC 4.8 02/09/2016    HGB 13.2 (L) 02/21/2016    HCT 38.7 (L) 02/21/2016    .0 (H) 02/09/2016     02/09/2016     Lab Results   Component Value Date    LABA1C 5.3 02/20/2016     Lab Results   Component Value Date    .4 02/20/2016     Lab Results   Component Value Date    LABA1C 5.3 02/20/2016     No components found for: CHLPL  Lab Results   Component Value Date    TRIG 117 01/21/2021    TRIG 168 (H) 01/15/2020    TRIG 201 (H) 01/03/2019     Lab Results   Component Value Date    HDL 58 01/21/2021    HDL 49 01/15/2020    HDL 53 01/03/2019     Lab Results   Component Value Date    LDLCALC 71 01/21/2021    LDLCALC 80 01/15/2020    LDLCALC 100 (H) 01/03/2019     Lab Results   Component Value Date    LABVLDL 23 01/21/2021    LABVLDL 34 01/15/2020    LABVLDL 40 01/03/2019         Assessment   Plan   1. Hypercholesterolemia  Controlled: Appears stable. We will continue current management and monitor for adverse reaction and disease progression.   Follow-up as noted below      2. Essential hypertension  Controlled: Appears stable. We will continue current management and monitor for adverse reaction and disease progression. Follow-up as noted below    Discussed use, benefit, and side effects of prescribed medications. Barriers to medication compliance addressed. All patient questions answered. Pt voiced understanding. RTC No follow-ups on file.

## 2022-01-14 DIAGNOSIS — I10 ESSENTIAL HYPERTENSION: ICD-10-CM

## 2022-01-14 RX ORDER — AMLODIPINE BESYLATE 5 MG/1
TABLET ORAL
Qty: 90 TABLET | Refills: 3 | Status: SHIPPED | OUTPATIENT
Start: 2022-01-14

## 2022-01-14 NOTE — TELEPHONE ENCOUNTER
Medication:   Requested Prescriptions     Pending Prescriptions Disp Refills    metoprolol tartrate (LOPRESSOR) 25 MG tablet [Pharmacy Med Name: METOPROLOL TARTRATE 25 MG TAB] 180 tablet 3     Sig: TAKE 1 TABLET BY MOUTH TWICE A DAY    amLODIPine (NORVASC) 5 MG tablet [Pharmacy Med Name: AMLODIPINE BESYLATE 5 MG TAB] 90 tablet 3     Sig: TAKE 1 TABLET BY MOUTH EVERY DAY        Last Filled:      Patient Phone Number: [phone] (home) [phone] (work)    Last appt: 9/15/2021   Next appt: 3/15/2022    Last OARRS: No flowsheet data found.

## 2022-02-22 ENCOUNTER — OFFICE VISIT (OUTPATIENT)
Dept: ORTHOPEDIC SURGERY | Age: 67
End: 2022-02-22
Payer: MEDICARE

## 2022-02-22 VITALS — HEIGHT: 68 IN | BODY MASS INDEX: 33.95 KG/M2 | WEIGHT: 224 LBS | RESPIRATION RATE: 16 BRPM

## 2022-02-22 DIAGNOSIS — Z96.651 STATUS POST TOTAL RIGHT KNEE REPLACEMENT: Primary | ICD-10-CM

## 2022-02-22 PROCEDURE — 1123F ACP DISCUSS/DSCN MKR DOCD: CPT | Performed by: PHYSICIAN ASSISTANT

## 2022-02-22 PROCEDURE — G8484 FLU IMMUNIZE NO ADMIN: HCPCS | Performed by: PHYSICIAN ASSISTANT

## 2022-02-22 PROCEDURE — G8427 DOCREV CUR MEDS BY ELIG CLIN: HCPCS | Performed by: PHYSICIAN ASSISTANT

## 2022-02-22 PROCEDURE — 1036F TOBACCO NON-USER: CPT | Performed by: PHYSICIAN ASSISTANT

## 2022-02-22 PROCEDURE — 99213 OFFICE O/P EST LOW 20 MIN: CPT | Performed by: PHYSICIAN ASSISTANT

## 2022-02-22 PROCEDURE — 3017F COLORECTAL CA SCREEN DOC REV: CPT | Performed by: PHYSICIAN ASSISTANT

## 2022-02-22 PROCEDURE — G8417 CALC BMI ABV UP PARAM F/U: HCPCS | Performed by: PHYSICIAN ASSISTANT

## 2022-02-22 PROCEDURE — 4040F PNEUMOC VAC/ADMIN/RCVD: CPT | Performed by: PHYSICIAN ASSISTANT

## 2022-02-23 NOTE — PROGRESS NOTES
Subjective:      Patient ID: Shilpi Starr is a 77 y.o.  male. Here for annual follow up visit. S/P right knee arthroplasty. The date of procedure- 2/19/2016. Surgeon: Jm Molina   Issues or complaints: none      Review of Systems:   A 14 point review of systems and history form completed by the patient has been reviewed. This form is scanned in the media tab of the patient's chart under today's date. Past Medical History:   Diagnosis Date    Allergic rhinitis, seasonal     Essential hypertension 1/28/2016    Hypercholesterolemia 8/23/2012       Family History   Problem Relation Age of Onset    Heart Disease Mother 68        Stent    Dementia Mother     Diabetes Father     Heart Disease Father 67        stent       Past Surgical History:   Procedure Laterality Date    BACK SURGERY      CERVICAL SPINE SURGERY      EYE SURGERY      HEMORRHOID SURGERY  1983    JOINT REPLACEMENT      KNEE SURGERY Right 1978    ACL/MCL repair    LASIK      TONSILLECTOMY      TOTAL KNEE ARTHROPLASTY Right 02-19-16    Dr. 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   Tobacco Use    Smoking status: Never Smoker    Smokeless tobacco: Never Used   Substance and Sexual Activity    Alcohol use:  Yes     Alcohol/week: 0.0 standard drinks    Drug use: No    Sexual activity: Never       Current Outpatient Medications   Medication Sig Dispense Refill    metoprolol tartrate (LOPRESSOR) 25 MG tablet TAKE 1 TABLET BY MOUTH TWICE A  tablet 3    amLODIPine (NORVASC) 5 MG tablet TAKE 1 TABLET BY MOUTH EVERY DAY 90 tablet 3    simvastatin (ZOCOR) 20 MG tablet TAKE 1 TABLET BY MOUTH EVERY DAY AT NIGHT 90 tablet 3    diphenhydrAMINE HCl ([employer] ALLERGY RELIEF ADULT PO) Take by mouth daily      aspirin EC 81 MG EC tablet Take 1 tablet by mouth daily 30 tablet 11    Coenzyme Q10 (COQ10 PO) Take by mouth      Omega-3 Fatty Acids (FISH OIL) 1000 MG CAPS Take 3,000 mg by mouth 3 times daily      Multiple Vitamins-Minerals (THERAPEUTIC MULTIVITAMIN-MINERALS) tablet Take 1 tablet by mouth daily       No current facility-administered medications for this visit. Objective:   He is alert, oriented x 3, pleasant, well nourished, developed and in no acute distress. Resp 16   Ht 5' 8\" (1.727 m)   Wt 224 lb (101.6 kg)   BMI 34.06 kg/m²      Examination of the right knee: Inspection of the skin demonstrates a well-healed midline incision. Inspection of the soft tissues without significant swelling or erythema. The overall alignment of the knee is neutral.  There is minimal intra-articular effusion. AROM     Extension 0     Flexion  115+   There no pain associated with ROM testing. Pes anserine bursa non-tender to palpation. Patellar tendon non-tender to palpation. Quadriceps tendon non-tender to palpation. Collateral ligaments non-tender to palpation. Popliteal fossa non-tender to palpation. Retro patellar crepitus is not present. There is no instability with varus/valgus stress testing in full extension or 90 degrees of flexion. There is no instability with anterior drawer testing. Extensor Mechanism is intact. Examination of the lower extremities are intact with sensation to light touch. Motor testing  5/5 in all major motor groups of the lower extremities. SLR negative. Examination of the lower extremities shows intact perfusion to all extremities. No cyanosis. Digits are warm to touch, capillary refill is less than 2 seconds. There is no edema noted. Examination of the skin over both lower extremities reveals: The skin to be intact without lacerations or abrasions. No significant erythema. No rashes or skin lesions. X Rays: was performed in the office today:   AP Standing, Lateral and Sunrise Right Knee: There is a right cemented total knee arthroplasty present. The alignment is satisfactory. There are no signs of failure or loosening. Diagnosis:        ICD-10-CM    1. Status post total right knee replacement  Z96.651 XR KNEE RIGHT (3 VIEWS)          Assessment/ Plan:     Assessment:    Clinically and radiographically stable right knee arthroplasty 6 years post op. Plan:  Medications-discussed the risk and benefits of prophylactic antibiotics for dental and surgical procedures. PT- A home exercise program was instructed today including ROM exercises and strengthening exercises. The patient verbalized understanding of these exercises as well as the importance of the exercise program to promote return of normal function. If pain intensifies or other problems arise you are to notify the office. Follow up- 2 years. Call or return to clinic if these symptoms worsen or fail to improve as anticipated.

## 2022-03-21 ENCOUNTER — OFFICE VISIT (OUTPATIENT)
Dept: PRIMARY CARE CLINIC | Age: 67
End: 2022-03-21
Payer: MEDICARE

## 2022-03-21 VITALS
HEART RATE: 76 BPM | SYSTOLIC BLOOD PRESSURE: 142 MMHG | TEMPERATURE: 98.7 F | WEIGHT: 222.2 LBS | BODY MASS INDEX: 33.79 KG/M2 | DIASTOLIC BLOOD PRESSURE: 72 MMHG

## 2022-03-21 DIAGNOSIS — E66.09 CLASS 1 OBESITY DUE TO EXCESS CALORIES WITHOUT SERIOUS COMORBIDITY WITH BODY MASS INDEX (BMI) OF 34.0 TO 34.9 IN ADULT: ICD-10-CM

## 2022-03-21 DIAGNOSIS — H53.9 VISION CHANGES: ICD-10-CM

## 2022-03-21 DIAGNOSIS — E78.00 HYPERCHOLESTEROLEMIA: ICD-10-CM

## 2022-03-21 DIAGNOSIS — I10 ESSENTIAL HYPERTENSION: ICD-10-CM

## 2022-03-21 DIAGNOSIS — Z00.00 MEDICARE ANNUAL WELLNESS VISIT, SUBSEQUENT: Primary | ICD-10-CM

## 2022-03-21 LAB
A/G RATIO: 1.6 (ref 1.1–2.2)
ALBUMIN SERPL-MCNC: 4.7 G/DL (ref 3.4–5)
ALP BLD-CCNC: 63 U/L (ref 40–129)
ALT SERPL-CCNC: 38 U/L (ref 10–40)
ANION GAP SERPL CALCULATED.3IONS-SCNC: 13 MMOL/L (ref 3–16)
AST SERPL-CCNC: 33 U/L (ref 15–37)
BILIRUB SERPL-MCNC: 0.6 MG/DL (ref 0–1)
BUN BLDV-MCNC: 11 MG/DL (ref 7–20)
CALCIUM SERPL-MCNC: 9.7 MG/DL (ref 8.3–10.6)
CHLORIDE BLD-SCNC: 103 MMOL/L (ref 99–110)
CHOLESTEROL, TOTAL: 169 MG/DL (ref 0–199)
CO2: 25 MMOL/L (ref 21–32)
CREAT SERPL-MCNC: 0.8 MG/DL (ref 0.8–1.3)
GFR AFRICAN AMERICAN: >60
GFR NON-AFRICAN AMERICAN: >60
GLUCOSE BLD-MCNC: 95 MG/DL (ref 70–99)
HDLC SERPL-MCNC: 57 MG/DL (ref 40–60)
LDL CHOLESTEROL CALCULATED: 78 MG/DL
POTASSIUM SERPL-SCNC: 4.4 MMOL/L (ref 3.5–5.1)
SODIUM BLD-SCNC: 141 MMOL/L (ref 136–145)
TOTAL PROTEIN: 7.6 G/DL (ref 6.4–8.2)
TRIGL SERPL-MCNC: 170 MG/DL (ref 0–150)
VLDLC SERPL CALC-MCNC: 34 MG/DL

## 2022-03-21 PROCEDURE — 3017F COLORECTAL CA SCREEN DOC REV: CPT | Performed by: FAMILY MEDICINE

## 2022-03-21 PROCEDURE — 1036F TOBACCO NON-USER: CPT | Performed by: FAMILY MEDICINE

## 2022-03-21 PROCEDURE — 4040F PNEUMOC VAC/ADMIN/RCVD: CPT | Performed by: FAMILY MEDICINE

## 2022-03-21 PROCEDURE — G0439 PPPS, SUBSEQ VISIT: HCPCS | Performed by: FAMILY MEDICINE

## 2022-03-21 PROCEDURE — G8484 FLU IMMUNIZE NO ADMIN: HCPCS | Performed by: FAMILY MEDICINE

## 2022-03-21 PROCEDURE — 99214 OFFICE O/P EST MOD 30 MIN: CPT | Performed by: FAMILY MEDICINE

## 2022-03-21 PROCEDURE — G8417 CALC BMI ABV UP PARAM F/U: HCPCS | Performed by: FAMILY MEDICINE

## 2022-03-21 PROCEDURE — 1123F ACP DISCUSS/DSCN MKR DOCD: CPT | Performed by: FAMILY MEDICINE

## 2022-03-21 PROCEDURE — G8427 DOCREV CUR MEDS BY ELIG CLIN: HCPCS | Performed by: FAMILY MEDICINE

## 2022-03-21 RX ORDER — METOPROLOL SUCCINATE 100 MG/1
100 TABLET, EXTENDED RELEASE ORAL DAILY
Qty: 90 TABLET | Refills: 3 | Status: SHIPPED | OUTPATIENT
Start: 2022-03-21

## 2022-03-21 RX ORDER — SIMVASTATIN 20 MG
TABLET ORAL
Qty: 90 TABLET | Refills: 3 | Status: SHIPPED | OUTPATIENT
Start: 2022-03-21

## 2022-03-21 ASSESSMENT — PATIENT HEALTH QUESTIONNAIRE - PHQ9
SUM OF ALL RESPONSES TO PHQ QUESTIONS 1-9: 0
1. LITTLE INTEREST OR PLEASURE IN DOING THINGS: 0
SUM OF ALL RESPONSES TO PHQ9 QUESTIONS 1 & 2: 0
SUM OF ALL RESPONSES TO PHQ QUESTIONS 1-9: 0
2. FEELING DOWN, DEPRESSED OR HOPELESS: 0

## 2022-03-21 NOTE — PATIENT INSTRUCTIONS
[facility]. Viviane Sneed MD  [facility], [address] [place] [address]   Phone: [phone], [phone]  Fax: [phone]      Personalized Preventive Plan for Angie Teixeira - 3/21/2022  Medicare offers a range of preventive health benefits. Some of the tests and screenings are paid in full while other may be subject to a deductible, co-insurance, and/or copay. Some of these benefits include a comprehensive review of your medical history including lifestyle, illnesses that may run in your family, and various assessments and screenings as appropriate. After reviewing your medical record and screening and assessments performed today your provider may have ordered immunizations, labs, imaging, and/or referrals for you. A list of these orders (if applicable) as well as your Preventive Care list are included within your After Visit Summary for your review. Other Preventive Recommendations:    · A preventive eye exam performed by an eye specialist is recommended every 1-2 years to screen for glaucoma; cataracts, macular degeneration, and other eye disorders. · A preventive dental visit is recommended every 6 months. · Try to get at least 150 minutes of exercise per week or 10,000 steps per day on a pedometer . · Order or download the FREE \"Exercise & Physical Activity: Your Everyday Guide\" from [employer]. Call 7-852.788.6363 or search [employer] online. · You need 9884-3402 mg of calcium and 8920-3549 IU of vitamin D per day. It is possible to meet your calcium requirement with diet alone, but a vitamin D supplement is usually necessary to meet this goal.  · When exposed to the sun, use a sunscreen that protects against both UVA and UVB radiation with an SPF of 30 or greater. Reapply every 2 to 3 hours or after sweating, drying off with a towel, or swimming. · Always wear a seat belt when traveling in a car.  Always wear a helmet when riding a bicycle or motorcycle.

## 2022-03-21 NOTE — PROGRESS NOTES
Chief Complaint   Patient presents with    Medicare AWV    Hypertension       HPI: Lori Sneed  presents for evaluation and management of Medicare annual wellness visit, hypertension, cholesterol and obesity. Gabrielle Severino notes he has not had any falls in the last year and passed his depression and cognitive screening test.  He notes his hearing is good. He does admit he is having some visual changes in his far vision is not as good despite having a Lasix previously. He gets to the dentist consistently. He does not have any advance care planning documents. He reports he is taking and tolerating his blood pressure medicine without lightheadedness or dizziness. Notes he is taking tolerating his cholesterol medicine without abdominal pain or myalgia. He continues to spin routinely to work on diet and exercise for weight loss.     Today's PHQ:    PHQ Scores 3/21/2022 3/15/2021 1/21/2021 1/15/2020 7/9/2019 1/3/2019 7/2/2018   PHQ2 Score 0 0 0 0 0 0 0   PHQ9 Score 0 0 0 0 0 0 0     Interpretation of Total Score Depression Severity: 1-4 = Minimal depression, 5-9 = Mild depression, 10-14 = Moderate depression, 15-19 = Moderately severe depression, 20-27 = Severe depression        Review of Systems    No Known Allergies  New Prescriptions    METOPROLOL SUCCINATE (TOPROL XL) 100 MG EXTENDED RELEASE TABLET    Take 1 tablet by mouth daily     Current Outpatient Medications   Medication Sig Dispense Refill    simvastatin (ZOCOR) 20 MG tablet TAKE 1 TABLET BY MOUTH EVERY DAY AT NIGHT 90 tablet 3    metoprolol succinate (TOPROL XL) 100 MG extended release tablet Take 1 tablet by mouth daily 90 tablet 3    amLODIPine (NORVASC) 5 MG tablet TAKE 1 TABLET BY MOUTH EVERY DAY 90 tablet 3    diphenhydrAMINE HCl ([employer] ALLERGY RELIEF ADULT PO) Take by mouth daily      aspirin EC 81 MG EC tablet Take 1 tablet by mouth daily 30 tablet 11    Coenzyme Q10 (COQ10 PO) Take by mouth      Omega-3 Fatty Acids (FISH OIL) 1000 MG CAPS Take 3,000 mg by mouth 3 times daily      Multiple Vitamins-Minerals (THERAPEUTIC MULTIVITAMIN-MINERALS) tablet Take 1 tablet by mouth daily       No current facility-administered medications for this visit. Past Medical History:   Diagnosis Date    Allergic rhinitis, seasonal     Essential hypertension 1/28/2016    Hypercholesterolemia 8/23/2012         Objective   BP (!) 142/72   Pulse 76   Temp 98.7 °F (37.1 °C)   Wt 222 lb 3.2 oz (100.8 kg)   BMI 33.79 kg/m²   Wt Readings from Last 3 Encounters:   03/21/22 222 lb 3.2 oz (100.8 kg)   02/22/22 224 lb (101.6 kg)   09/15/21 218 lb 3.2 oz (99 kg)       Physical Exam  Constitutional:       Appearance: He is well-developed. Cardiovascular:      Rate and Rhythm: Normal rate and regular rhythm. Heart sounds: No murmur heard. No friction rub. No gallop. Pulmonary:      Effort: Pulmonary effort is normal.      Breath sounds: Normal breath sounds. No wheezing or rales. Abdominal:      General: Bowel sounds are normal. There is no distension. Palpations: Abdomen is soft. There is no mass. Tenderness: There is no abdominal tenderness. Skin:     General: Skin is warm and dry. Findings: No rash.            Chemistry        Component Value Date/Time     01/21/2021 1443    K 4.5 01/21/2021 1443     01/21/2021 1443    CO2 26 01/21/2021 1443    BUN 12 01/21/2021 1443    CREATININE 0.8 01/21/2021 1443        Component Value Date/Time    CALCIUM 9.5 01/21/2021 1443    ALKPHOS 68 01/21/2021 1443    AST 28 01/21/2021 1443    ALT 35 01/21/2021 1443    BILITOT 0.3 01/21/2021 1443          Lab Results   Component Value Date    WBC 4.8 02/09/2016    HGB 13.2 (L) 02/21/2016    HCT 38.7 (L) 02/21/2016    .0 (H) 02/09/2016     02/09/2016     Lab Results   Component Value Date    LABA1C 5.3 02/20/2016     Lab Results   Component Value Date    .4 02/20/2016     Lab Results   Component Value Date    LABA1C 5.3 02/20/2016 No components found for: CHLPL  Lab Results   Component Value Date    TRIG 117 01/21/2021    TRIG 168 (H) 01/15/2020    TRIG 201 (H) 01/03/2019     Lab Results   Component Value Date    HDL 58 01/21/2021    HDL 49 01/15/2020    HDL 53 01/03/2019     Lab Results   Component Value Date    LDLCALC 71 01/21/2021    LDLCALC 80 01/15/2020    LDLCALC 100 (H) 01/03/2019     Lab Results   Component Value Date    LABVLDL 23 01/21/2021    LABVLDL 34 01/15/2020    LABVLDL 40 01/03/2019         Assessment   Plan   1. Medicare annual wellness visit, subsequent  Gave patient advance care planning documents to complete for follow-up visit. I also referred him to Dr. Tom Lloyd for eye exam.  He may elect to use somebody different but I will leave that to him. He otherwise has few opportunities for wellness    2. Essential hypertension  Uncontrolled: We will titrate up his metoprolol to 100 mg of Toprol-XL daily in addition to his amlodipine and follow-up in 1 month  - Lipid Panel; Future  - Comprehensive Metabolic Panel; Future  - metoprolol succinate (TOPROL XL) 100 MG extended release tablet; Take 1 tablet by mouth daily  Dispense: 90 tablet; Refill: 3    3. Hypercholesterolemia  Controlled: Appears stable. We will continue current management and monitor for adverse reaction and disease progression. Follow-up as noted below    - Lipid Panel; Future  - Comprehensive Metabolic Panel; Future  - simvastatin (ZOCOR) 20 MG tablet; TAKE 1 TABLET BY MOUTH EVERY DAY AT NIGHT  Dispense: 90 tablet; Refill: 3    4. Class 1 obesity due to excess calories without serious comorbidity with body mass index (BMI) of 34.0 to 34.9 in adult  Appears stable: Continue to work on diet and exercise    5.  Vision changes  We will refer for ophthalmology exam and follow-up  - [facility] - Manuel Perez MD, (Cataract Surgery, Corneal Transplant, Corneal and External Disease, and [facility]ris Surgery) Ophthalmology, Heather Lambert received counseling on the following healthy behaviors: nutrition and exercise  Discussed use, benefit, and side effects of prescribed medications. Barriers to medication compliance addressed. All patient questions answered. Pt voiced understanding. RTC Return in 1 month (on 4/21/2022) for Medicare Annual Wellness Visit in 1 year. Medicare Annual Wellness Visit    Noemí Coelho is here for Medicare AWV and Hypertension    Assessment & Plan   Medicare annual wellness visit, subsequent  Essential hypertension  Hypercholesterolemia  Class 1 obesity due to excess calories without serious comorbidity with body mass index (BMI) of 34.0 to 34.9 in adult  Vision changes  Initial Medicare annual wellness visit      Recommendations for Preventive Services Due: see orders and patient instructions/AVS.  Recommended screening schedule for the next 5-10 years is provided to the patient in written form: see Patient Instructions/AVS.     Return for Medicare Annual Wellness Visit in 1 year. Subjective   The following acute and/or chronic problems were also addressed today:  As above    Patient's complete Health Risk Assessment and screening values have been reviewed and are found in Flowsheets. The following problems were reviewed today and where indicated follow up appointments were made and/or referrals ordered.     Positive Risk Factor Screenings with Interventions:             General Health and ACP:  General  In general, how would you say your health is?: Good  In the past 7 days, have you experienced any of the following: New or Increased Pain, New or Increased Fatigue, Loneliness, Social Isolation, Stress or Anger?: (!) Yes  Select all that apply: (!) Social Isolation  Do you get the social and emotional support that you need?: Yes  Do you have a Living Will?: (!) No    Advance Directives     Power of  Living Will ACP-Advance Directive ACP-Power of     Not on File Not on File Not on File Not on File      General Health Risk Interventions:  · No Living Will: Advance Care Planning addressed with patient today    Health Habits/Nutrition:     Physical Activity: Insufficiently Active    Days of Exercise per Week: 3 days    Minutes of Exercise per Session: 30 min     Have you lost any weight without trying in the past 3 months?: No     Have you seen the dentist within the past year?: Yes    Health Habits/Nutrition Interventions:  · Patient will continue his dental care follow-up    Hearing/Vision:  Do you or your family notice any trouble with your hearing that hasn't been managed with hearing aids?: No  Do you have difficulty driving, watching TV, or doing any of your daily activities because of your eyesight?: No  Have you had an eye exam within the past year?: (!) No  No exam data present    Hearing/Vision Interventions:  · Referred patient for ophthalmology exam    Safety:  Do you have working smoke detectors?: (!) No  Do you have any tripping hazards - loose or unsecured carpets or rugs?: No  Do you have any tripping hazards - clutter in doorways, halls, or stairs?: No  Do you have either shower bars, grab bars, non-slip mats or non-slip surfaces in your shower or bathtub?: (!) No  Do all of your stairways have a railing or banister?: Yes  Do you always fasten your seatbelt when you are in a car?: Yes    Safety Interventions:  · Patient declines any further evaluation/treatment for this issue           Objective   Vitals:    03/21/22 0944   BP: (!) 149/77   Pulse: 76   Temp: 98.7 °F (37.1 °C)   Weight: 222 lb 3.2 oz (100.8 kg)      Body mass index is 33.79 kg/m². No Known Allergies  Prior to Visit Medications    Medication Sig Taking?  Authorizing Provider   metoprolol tartrate (LOPRESSOR) 25 MG tablet TAKE 1 TABLET BY MOUTH TWICE A DAY Yes Brook Reyes MD   amLODIPine (NORVASC) 5 MG tablet TAKE 1 TABLET BY MOUTH EVERY DAY Yes Brook Reyes MD   simvastatin (ZOCOR) 20 MG tablet TAKE 1 TABLET BY MOUTH EVERY DAY AT NIGHT Yes Kevan Olivo MD   diphenhydrAMINE HCl ([employer] ALLERGY RELIEF ADULT PO) Take by mouth daily Yes Historical Provider, MD   aspirin EC 81 MG EC tablet Take 1 tablet by mouth daily Yes Kevan Olivo MD   Coenzyme Q10 (COQ10 PO) Take by mouth Yes Historical Provider, MD   Omega-3 Fatty Acids (FISH OIL) 1000 MG CAPS Take 3,000 mg by mouth 3 times daily Yes Historical Provider, MD   Multiple Vitamins-Minerals (THERAPEUTIC MULTIVITAMIN-MINERALS) tablet Take 1 tablet by mouth daily Yes Historical Provider, MD Geiger (Including outside providers/suppliers regularly involved in providing care):   Patient Care Team:  Kevan Olivo MD as PCP - General (Family Medicine)  Kevan Olivo MD as PCP - [facility]    Reviewed and updated this visit:  Tobacco  Allergies  Meds  Problems  Med Hx  Surg Hx  Soc Hx  Fam Hx

## 2022-04-21 ENCOUNTER — OFFICE VISIT (OUTPATIENT)
Dept: PRIMARY CARE CLINIC | Age: 67
End: 2022-04-21
Payer: MEDICARE

## 2022-04-21 VITALS
OXYGEN SATURATION: 95 % | HEART RATE: 72 BPM | DIASTOLIC BLOOD PRESSURE: 77 MMHG | HEIGHT: 68 IN | SYSTOLIC BLOOD PRESSURE: 136 MMHG | WEIGHT: 220.2 LBS | BODY MASS INDEX: 33.37 KG/M2 | TEMPERATURE: 97.2 F

## 2022-04-21 DIAGNOSIS — H53.9 VISION CHANGES: ICD-10-CM

## 2022-04-21 DIAGNOSIS — I10 ESSENTIAL HYPERTENSION: Primary | ICD-10-CM

## 2022-04-21 PROCEDURE — 1036F TOBACCO NON-USER: CPT | Performed by: FAMILY MEDICINE

## 2022-04-21 PROCEDURE — 99213 OFFICE O/P EST LOW 20 MIN: CPT | Performed by: FAMILY MEDICINE

## 2022-04-21 PROCEDURE — G8427 DOCREV CUR MEDS BY ELIG CLIN: HCPCS | Performed by: FAMILY MEDICINE

## 2022-04-21 PROCEDURE — 3017F COLORECTAL CA SCREEN DOC REV: CPT | Performed by: FAMILY MEDICINE

## 2022-04-21 PROCEDURE — 4040F PNEUMOC VAC/ADMIN/RCVD: CPT | Performed by: FAMILY MEDICINE

## 2022-04-21 PROCEDURE — 1123F ACP DISCUSS/DSCN MKR DOCD: CPT | Performed by: FAMILY MEDICINE

## 2022-04-21 PROCEDURE — G8417 CALC BMI ABV UP PARAM F/U: HCPCS | Performed by: FAMILY MEDICINE

## 2022-04-21 SDOH — ECONOMIC STABILITY: FOOD INSECURITY: WITHIN THE PAST 12 MONTHS, THE FOOD YOU BOUGHT JUST DIDN'T LAST AND YOU DIDN'T HAVE MONEY TO GET MORE.: NEVER TRUE

## 2022-04-21 SDOH — ECONOMIC STABILITY: FOOD INSECURITY: WITHIN THE PAST 12 MONTHS, YOU WORRIED THAT YOUR FOOD WOULD RUN OUT BEFORE YOU GOT MONEY TO BUY MORE.: NEVER TRUE

## 2022-04-21 ASSESSMENT — SOCIAL DETERMINANTS OF HEALTH (SDOH): HOW HARD IS IT FOR YOU TO PAY FOR THE VERY BASICS LIKE FOOD, HOUSING, MEDICAL CARE, AND HEATING?: NOT HARD AT ALL

## 2022-04-21 NOTE — PROGRESS NOTES
friction rub. No gallop. Pulmonary:      Effort: Pulmonary effort is normal.      Breath sounds: Normal breath sounds. No wheezing or rales. Abdominal:      General: Bowel sounds are normal. There is no distension. Palpations: Abdomen is soft. There is no mass. Tenderness: There is no abdominal tenderness. Skin:     General: Skin is warm and dry. Findings: No rash. Chemistry        Component Value Date/Time     03/21/2022 1047    K 4.4 03/21/2022 1047     03/21/2022 1047    CO2 25 03/21/2022 1047    BUN 11 03/21/2022 1047    CREATININE 0.8 03/21/2022 1047        Component Value Date/Time    CALCIUM 9.7 03/21/2022 1047    ALKPHOS 63 03/21/2022 1047    AST 33 03/21/2022 1047    ALT 38 03/21/2022 1047    BILITOT 0.6 03/21/2022 1047          Lab Results   Component Value Date    WBC 4.8 02/09/2016    HGB 13.2 (L) 02/21/2016    HCT 38.7 (L) 02/21/2016    .0 (H) 02/09/2016     02/09/2016     Lab Results   Component Value Date    LABA1C 5.3 02/20/2016     Lab Results   Component Value Date    .4 02/20/2016     Lab Results   Component Value Date    LABA1C 5.3 02/20/2016     No components found for: CHLPL  Lab Results   Component Value Date    TRIG 170 (H) 03/21/2022    TRIG 117 01/21/2021    TRIG 168 (H) 01/15/2020     Lab Results   Component Value Date    HDL 57 03/21/2022    HDL 58 01/21/2021    HDL 49 01/15/2020     Lab Results   Component Value Date    LDLCALC 78 03/21/2022    LDLCALC 71 01/21/2021    LDLCALC 80 01/15/2020     Lab Results   Component Value Date    LABVLDL 34 03/21/2022    LABVLDL 23 01/21/2021    LABVLDL 34 01/15/2020         Assessment   Plan   1. Essential hypertension  Controlled: Appears stable. We will continue current management and monitor for adverse reaction and disease progression. Follow-up as noted below      2. Vision changes  Counseled patient to reach out to Dr. Rich Cary to set up an appointment.     Discussed use, benefit, and side effects of prescribed medications. Barriers to medication compliance addressed. All patient questions answered. Pt voiced understanding. RTC Return in about 6 months (around 10/21/2022).

## 2022-10-05 ENCOUNTER — OFFICE VISIT (OUTPATIENT)
Dept: PRIMARY CARE CLINIC | Age: 67
End: 2022-10-05
Payer: MEDICARE

## 2022-10-05 VITALS
HEIGHT: 71 IN | HEART RATE: 74 BPM | OXYGEN SATURATION: 97 % | DIASTOLIC BLOOD PRESSURE: 79 MMHG | BODY MASS INDEX: 31.44 KG/M2 | SYSTOLIC BLOOD PRESSURE: 131 MMHG | TEMPERATURE: 97.7 F | WEIGHT: 224.6 LBS

## 2022-10-05 DIAGNOSIS — Z23 NEED FOR VACCINATION: ICD-10-CM

## 2022-10-05 DIAGNOSIS — I10 ESSENTIAL HYPERTENSION: ICD-10-CM

## 2022-10-05 DIAGNOSIS — J30.1 SEASONAL ALLERGIC RHINITIS DUE TO POLLEN: ICD-10-CM

## 2022-10-05 DIAGNOSIS — E78.00 HYPERCHOLESTEROLEMIA: Primary | ICD-10-CM

## 2022-10-05 PROCEDURE — 3017F COLORECTAL CA SCREEN DOC REV: CPT | Performed by: FAMILY MEDICINE

## 2022-10-05 PROCEDURE — 1036F TOBACCO NON-USER: CPT | Performed by: FAMILY MEDICINE

## 2022-10-05 PROCEDURE — G8417 CALC BMI ABV UP PARAM F/U: HCPCS | Performed by: FAMILY MEDICINE

## 2022-10-05 PROCEDURE — G0008 ADMIN INFLUENZA VIRUS VAC: HCPCS | Performed by: FAMILY MEDICINE

## 2022-10-05 PROCEDURE — 99214 OFFICE O/P EST MOD 30 MIN: CPT | Performed by: FAMILY MEDICINE

## 2022-10-05 PROCEDURE — 90694 VACC AIIV4 NO PRSRV 0.5ML IM: CPT | Performed by: FAMILY MEDICINE

## 2022-10-05 PROCEDURE — 1123F ACP DISCUSS/DSCN MKR DOCD: CPT | Performed by: FAMILY MEDICINE

## 2022-10-05 PROCEDURE — G8484 FLU IMMUNIZE NO ADMIN: HCPCS | Performed by: FAMILY MEDICINE

## 2022-10-05 PROCEDURE — G8427 DOCREV CUR MEDS BY ELIG CLIN: HCPCS | Performed by: FAMILY MEDICINE

## 2022-10-05 ASSESSMENT — PATIENT HEALTH QUESTIONNAIRE - PHQ9
SUM OF ALL RESPONSES TO PHQ QUESTIONS 1-9: 0
2. FEELING DOWN, DEPRESSED OR HOPELESS: 0
1. LITTLE INTEREST OR PLEASURE IN DOING THINGS: 0
SUM OF ALL RESPONSES TO PHQ QUESTIONS 1-9: 0
SUM OF ALL RESPONSES TO PHQ9 QUESTIONS 1 & 2: 0

## 2022-10-05 NOTE — PROGRESS NOTES
Chief Complaint   Patient presents with    Hypertension    Cholesterol Problem       HPI: Gely Vigil  presents for evaluation and management of attention cholesterol and seasonal allergies. He notes he is feeling well. He is taking and tolerating his blood pressure medicine without lightheadedness or dizziness. He is taking tolerating his cholesterol medicine without abdominal pain or myalgia. He reports his allergies are significantly worse. He has been taking over-the-counter antihistamine from [facility] with good effect. He has tried nose spray uses in the past and did not like them    Today's PHQ:    PHQ Scores 10/5/2022 3/21/2022 3/15/2021 1/21/2021 1/15/2020 7/9/2019 1/3/2019   PHQ2 Score 0 0 0 0 0 0 0   PHQ9 Score 0 0 0 0 0 0 0     Interpretation of Total Score Depression Severity: 1-4 = Minimal depression, 5-9 = Mild depression, 10-14 = Moderate depression, 15-19 = Moderately severe depression, 20-27 = Severe depression        Review of Systems    No Known Allergies  New Prescriptions    No medications on file     Current Outpatient Medications   Medication Sig Dispense Refill    simvastatin (ZOCOR) 20 MG tablet TAKE 1 TABLET BY MOUTH EVERY DAY AT NIGHT 90 tablet 3    metoprolol succinate (TOPROL XL) 100 MG extended release tablet Take 1 tablet by mouth daily 90 tablet 3    amLODIPine (NORVASC) 5 MG tablet TAKE 1 TABLET BY MOUTH EVERY DAY 90 tablet 3    diphenhydrAMINE HCl ([employer] ALLERGY RELIEF ADULT PO) Take by mouth daily      Coenzyme Q10 (COQ10 PO) Take by mouth      Omega-3 Fatty Acids (FISH OIL) 1000 MG CAPS Take 3,000 mg by mouth 3 times daily      Multiple Vitamins-Minerals (THERAPEUTIC MULTIVITAMIN-MINERALS) tablet Take 1 tablet by mouth daily      aspirin EC 81 MG EC tablet Take 1 tablet by mouth daily 30 tablet 11     No current facility-administered medications for this visit.        Past Medical History:   Diagnosis Date    Allergic rhinitis, seasonal     Essential hypertension 1/28/2016 Hypercholesterolemia 8/23/2012         Objective   /79   Pulse 74   Temp 97.7 °F (36.5 °C)   Ht 5' 11\" (1.803 m)   Wt 224 lb 9.6 oz (101.9 kg)   SpO2 97%   BMI 31.33 kg/m²   Wt Readings from Last 3 Encounters:   10/05/22 224 lb 9.6 oz (101.9 kg)   04/21/22 220 lb 3.2 oz (99.9 kg)   03/21/22 222 lb 3.2 oz (100.8 kg)       Physical Exam  Constitutional:       Appearance: He is well-developed. Cardiovascular:      Rate and Rhythm: Normal rate and regular rhythm. Heart sounds: No murmur heard. No friction rub. No gallop. Pulmonary:      Effort: Pulmonary effort is normal.      Breath sounds: Normal breath sounds. No wheezing or rales. Abdominal:      General: Bowel sounds are normal. There is no distension. Palpations: Abdomen is soft. There is no mass. Tenderness: There is no abdominal tenderness. Skin:     General: Skin is warm and dry. Findings: No rash.          Chemistry        Component Value Date/Time     03/21/2022 1047    K 4.4 03/21/2022 1047     03/21/2022 1047    CO2 25 03/21/2022 1047    BUN 11 03/21/2022 1047    CREATININE 0.8 03/21/2022 1047        Component Value Date/Time    CALCIUM 9.7 03/21/2022 1047    ALKPHOS 63 03/21/2022 1047    AST 33 03/21/2022 1047    ALT 38 03/21/2022 1047    BILITOT 0.6 03/21/2022 1047          Lab Results   Component Value Date    WBC 4.8 02/09/2016    HGB 13.2 (L) 02/21/2016    HCT 38.7 (L) 02/21/2016    .0 (H) 02/09/2016     02/09/2016     Lab Results   Component Value Date    LABA1C 5.3 02/20/2016     Lab Results   Component Value Date    .4 02/20/2016     Lab Results   Component Value Date    LABA1C 5.3 02/20/2016     No components found for: CHLPL  Lab Results   Component Value Date    TRIG 170 (H) 03/21/2022    TRIG 117 01/21/2021    TRIG 168 (H) 01/15/2020     Lab Results   Component Value Date    HDL 57 03/21/2022    HDL 58 01/21/2021    HDL 49 01/15/2020     Lab Results   Component Value Date LDLCALC 78 03/21/2022    LDLCALC 71 01/21/2021    LDLCALC 80 01/15/2020     Lab Results   Component Value Date    LABVLDL 34 03/21/2022    LABVLDL 23 01/21/2021    LABVLDL 34 01/15/2020         Assessment   Plan   1. Hypercholesterolemia  Controlled: Appears stable. We will continue current management and monitor for adverse reaction and disease progression. Follow-up as noted below      2. Essential hypertension  Controlled: Appears stable. We will continue current management and monitor for adverse reaction and disease progression. Follow-up as noted below      3. Need for vaccination    We will give flu shot today    4. Seasonal allergic rhinitis due to pollen  Offered patient nasal steroids and nasal antihistamines, he declines. He notes he is not having any difficulty with urination. I counseled him to monitor for that. RTC Return in about 6 months (around 4/5/2023).

## 2023-01-04 DIAGNOSIS — I10 ESSENTIAL HYPERTENSION: ICD-10-CM

## 2023-01-05 RX ORDER — AMLODIPINE BESYLATE 5 MG/1
TABLET ORAL
Qty: 90 TABLET | Refills: 3 | Status: SHIPPED | OUTPATIENT
Start: 2023-01-05

## 2023-01-05 NOTE — TELEPHONE ENCOUNTER
Medication:   Requested Prescriptions     Pending Prescriptions Disp Refills    amLODIPine (NORVASC) 5 MG tablet [Pharmacy Med Name: AMLODIPINE BESYLATE 5 MG TAB] 90 tablet 3     Sig: TAKE 1 TABLET BY MOUTH EVERY DAY       Last Filled:      Patient Phone Number: [phone] (home) [phone] (work)    Last appt: 10/5/2022   Next appt: 4/5/2023  Return in about 6 months (around 4/5/2023).   Last PSA: No results found for: PSA

## 2023-03-08 DIAGNOSIS — I10 ESSENTIAL HYPERTENSION: ICD-10-CM

## 2023-03-08 RX ORDER — METOPROLOL SUCCINATE 100 MG/1
TABLET, EXTENDED RELEASE ORAL
Qty: 90 TABLET | Refills: 3 | Status: SHIPPED | OUTPATIENT
Start: 2023-03-08

## 2023-03-08 NOTE — TELEPHONE ENCOUNTER
Medication:   Requested Prescriptions     Pending Prescriptions Disp Refills    metoprolol succinate (TOPROL XL) 100 MG extended release tablet [Pharmacy Med Name: METOPROLOL SUCC  MG TAB] 90 tablet 3     Sig: TAKE 1 TABLET BY MOUTH EVERY DAY        Last Filled:  03/21/22    Patient Phone Number: [phone] (home) [phone] (work)    Last appt: 10/5/2022   Next appt: 4/5/2023    Last OARRS: No flowsheet data found.

## 2023-04-10 DIAGNOSIS — I10 ESSENTIAL HYPERTENSION: ICD-10-CM

## 2023-04-10 DIAGNOSIS — E78.00 HYPERCHOLESTEROLEMIA: ICD-10-CM

## 2023-04-10 LAB
ALBUMIN SERPL-MCNC: 4.5 G/DL (ref 3.4–5)
ALBUMIN/GLOB SERPL: 1.4 {RATIO} (ref 1.1–2.2)
ALP SERPL-CCNC: 67 U/L (ref 40–129)
ALT SERPL-CCNC: 32 U/L (ref 10–40)
ANION GAP SERPL CALCULATED.3IONS-SCNC: 12 MMOL/L (ref 3–16)
AST SERPL-CCNC: 27 U/L (ref 15–37)
BILIRUB SERPL-MCNC: 0.4 MG/DL (ref 0–1)
BUN SERPL-MCNC: 7 MG/DL (ref 7–20)
CALCIUM SERPL-MCNC: 9.7 MG/DL (ref 8.3–10.6)
CHLORIDE SERPL-SCNC: 103 MMOL/L (ref 99–110)
CHOLEST SERPL-MCNC: 175 MG/DL (ref 0–199)
CO2 SERPL-SCNC: 25 MMOL/L (ref 21–32)
CREAT SERPL-MCNC: 0.8 MG/DL (ref 0.8–1.3)
GFR SERPLBLD CREATININE-BSD FMLA CKD-EPI: >60 ML/MIN/{1.73_M2}
GLUCOSE SERPL-MCNC: 102 MG/DL (ref 70–99)
HDLC SERPL-MCNC: 50 MG/DL (ref 40–60)
LDLC SERPL CALC-MCNC: 77 MG/DL
POTASSIUM SERPL-SCNC: 4.7 MMOL/L (ref 3.5–5.1)
PROT SERPL-MCNC: 7.7 G/DL (ref 6.4–8.2)
SODIUM SERPL-SCNC: 140 MMOL/L (ref 136–145)
TRIGL SERPL-MCNC: 238 MG/DL (ref 0–150)
VLDLC SERPL CALC-MCNC: 48 MG/DL

## 2023-10-10 ENCOUNTER — OFFICE VISIT (OUTPATIENT)
Dept: PRIMARY CARE CLINIC | Age: 68
End: 2023-10-10

## 2023-10-10 VITALS
BODY MASS INDEX: 31.02 KG/M2 | HEIGHT: 71 IN | DIASTOLIC BLOOD PRESSURE: 75 MMHG | SYSTOLIC BLOOD PRESSURE: 124 MMHG | WEIGHT: 221.6 LBS | TEMPERATURE: 97.9 F | HEART RATE: 67 BPM

## 2023-10-10 DIAGNOSIS — T36.0X5A ADVERSE EFFECT OF AMOXICILLIN: ICD-10-CM

## 2023-10-10 DIAGNOSIS — E78.00 HYPERCHOLESTEROLEMIA: ICD-10-CM

## 2023-10-10 DIAGNOSIS — I10 ESSENTIAL HYPERTENSION: Primary | ICD-10-CM

## 2023-10-10 RX ORDER — AMLODIPINE BESYLATE 5 MG/1
5 TABLET ORAL DAILY
Qty: 90 TABLET | Refills: 3 | Status: SHIPPED | OUTPATIENT
Start: 2023-10-10

## 2023-10-10 ASSESSMENT — ENCOUNTER SYMPTOMS
NAUSEA: 0
SHORTNESS OF BREATH: 0
WHEEZING: 0

## 2023-10-10 NOTE — PROGRESS NOTES
[facility]  Establish care visit   10/10/2023    Carley Encarnacion (:  1955) is a 76 y.o. male, here to establish care. Chief Complaint   Patient presents with    New Patient    Hypertension    Medication Refill     Amlodipine 90 day     Other     Discuss being allergic to amoxicillin           ASSESSMENT/ PLAN  1. Essential hypertension  Controlled, continue current medication regimen. Update labs. - Comprehensive Metabolic Panel; Future  - Hemoglobin A1C; Future  - Lipid Panel; Future  - amLODIPine (NORVASC) 5 MG tablet; Take 1 tablet by mouth daily  Dispense: 90 tablet; Refill: 3    2. Adverse effect of amoxicillin  Uncontrolled, discussed risk and benefit of clindamycin prophylaxis. Okay to switch back as patient is agreeable and aware of risks. 3. Hypercholesterolemia  The 10-year ASCVD risk score (Martha ORDOÑEZ, et al., 2019) is: 16.1%    Values used to calculate the score:      Age: 76 years      Sex: Male      Is Non- : No      Diabetic: No      Tobacco smoker: No      Systolic Blood Pressure: 516 mmHg      Is BP treated: Yes      HDL Cholesterol: 50 mg/dL      Total Cholesterol: 175 mg/dL  Continue simvastatin, update lipid panel       Return in about 6 months (around 4/10/2024) for blood pressure follow-up. HPI  Patient presents today to establish care. He is taking amlodipine 5 mg, metoprolol 100 mg daily as prescribed for his hypertension with good control. He has a history of a right TKA, and has been recommended by his orthopedic surgeon that he take antibiotic prophylaxis prior to any dental appointments. They were originally prescribing him clindamycin, but have switched to amoxicillin to reduce risk of C. difficile. Patient notes that he experiences diarrhea, generalized malaise when taking amoxicillin and would like approval to switch back to clindamycin.     ROS  Review of Systems   Constitutional:  Negative for activity change and unexpected

## 2024-03-04 DIAGNOSIS — E78.00 HYPERCHOLESTEROLEMIA: ICD-10-CM

## 2024-03-04 DIAGNOSIS — I10 ESSENTIAL HYPERTENSION: ICD-10-CM

## 2024-03-04 RX ORDER — SIMVASTATIN 20 MG
TABLET ORAL
Qty: 90 TABLET | Refills: 3 | Status: SHIPPED | OUTPATIENT
Start: 2024-03-04

## 2024-03-04 RX ORDER — METOPROLOL SUCCINATE 100 MG/1
100 TABLET, EXTENDED RELEASE ORAL DAILY
Qty: 90 TABLET | Refills: 3 | Status: SHIPPED | OUTPATIENT
Start: 2024-03-04

## 2024-03-04 NOTE — PROGRESS NOTES
Medication:   Requested Prescriptions     Pending Prescriptions Disp Refills    metoprolol succinate (TOPROL XL) 100 MG extended release tablet 90 tablet 3     Sig: Take 1 tablet by mouth daily    simvastatin (ZOCOR) 20 MG tablet 90 tablet 3     Sig: TAKE 1 TABLET BY MOUTH EVERY DAY AT NIGHT        Last Filled:      Patient Phone Number: [phone] (home)     Last appt: 10/10/2023   Next appt: 4/10/2024    Last OARRS:        No data to display                Medication:   Requested Prescriptions     Pending Prescriptions Disp Refills    metoprolol succinate (TOPROL XL) 100 MG extended release tablet 90 tablet 3     Sig: Take 1 tablet by mouth daily        Last Filled:  03/08/2023    Patient Phone Number: [phone] (home)     Last appt: 10/10/2023   Next appt: 4/10/2024    Last OARRS:        No data to display

## 2024-03-20 ENCOUNTER — OFFICE VISIT (OUTPATIENT)
Dept: ORTHOPEDIC SURGERY | Age: 69
End: 2024-03-20
Payer: MEDICARE

## 2024-03-20 VITALS — WEIGHT: 221 LBS | HEIGHT: 71 IN | BODY MASS INDEX: 30.94 KG/M2

## 2024-03-20 DIAGNOSIS — M79.604 LUMBAR PAIN WITH RADIATION DOWN RIGHT LEG: ICD-10-CM

## 2024-03-20 DIAGNOSIS — Z96.651 HX OF TOTAL KNEE ARTHROPLASTY, RIGHT: Primary | ICD-10-CM

## 2024-03-20 DIAGNOSIS — M54.50 LUMBAR PAIN WITH RADIATION DOWN RIGHT LEG: ICD-10-CM

## 2024-03-20 PROCEDURE — 1036F TOBACCO NON-USER: CPT | Performed by: PHYSICIAN ASSISTANT

## 2024-03-20 PROCEDURE — G8427 DOCREV CUR MEDS BY ELIG CLIN: HCPCS | Performed by: PHYSICIAN ASSISTANT

## 2024-03-20 PROCEDURE — G8484 FLU IMMUNIZE NO ADMIN: HCPCS | Performed by: PHYSICIAN ASSISTANT

## 2024-03-20 PROCEDURE — 1123F ACP DISCUSS/DSCN MKR DOCD: CPT | Performed by: PHYSICIAN ASSISTANT

## 2024-03-20 PROCEDURE — G8417 CALC BMI ABV UP PARAM F/U: HCPCS | Performed by: PHYSICIAN ASSISTANT

## 2024-03-20 PROCEDURE — 99214 OFFICE O/P EST MOD 30 MIN: CPT | Performed by: PHYSICIAN ASSISTANT

## 2024-03-20 PROCEDURE — 3017F COLORECTAL CA SCREEN DOC REV: CPT | Performed by: PHYSICIAN ASSISTANT

## 2024-03-20 RX ORDER — METHYLPREDNISOLONE 4 MG/1
TABLET ORAL
Qty: 1 KIT | Refills: 0 | Status: SHIPPED | OUTPATIENT
Start: 2024-03-20

## 2024-03-20 NOTE — PROGRESS NOTES
Subjective:      Patient ID: Cluade Cain is a 68 y.o.  male.  Here for annual follow up visit.   S/P right knee arthroplasty.   The date of procedure- 2/19/2016.    Surgeon: Bret   Issues or complaints: He states his right knee is doing well.  No pain, stiffness or crepitus.    He is having new onset of right buttock pain which he describes as \"sciatica\".  This pain started a several days ago.  He did take some Aleve for a few days and it has lessened.  He does report intermittent low back pain but denies any significant numbness or tingling into the lower extremities, weakness, saddle anesthesia, bladder or bowel changes.      Review of Systems:  I have reviewed the clinically relevant past medical history, medications, allergies, family history, social history, and 13 point Review of Systems from the patient's recent history form & documented any details relevant to today's presenting complaints in the history above. The patient's self-reported past medical history, medications, allergies, family history, social history, and Review of Systems form from today's date have been scanned into the chart under the \"Media\" tab.       Past Medical History:   Diagnosis Date    Allergic rhinitis, seasonal     Essential hypertension 1/28/2016    Hypercholesterolemia 8/23/2012       Family History   Problem Relation Age of Onset    Heart Disease Mother 73        Stent    Dementia Mother     Diabetes Father     Heart Disease Father 72        stent       Past Surgical History:   Procedure Laterality Date    BACK SURGERY      CERVICAL SPINE SURGERY      EYE SURGERY      HEMORRHOID SURGERY  1983    JOINT REPLACEMENT      KNEE SURGERY Right 1978    ACL/MCL repair    LASIK      TONSILLECTOMY      TOTAL KNEE ARTHROPLASTY Right 02-19-16    Dr. 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

## 2024-04-10 ENCOUNTER — OFFICE VISIT (OUTPATIENT)
Dept: PRIMARY CARE CLINIC | Age: 69
End: 2024-04-10
Payer: MEDICARE

## 2024-04-10 VITALS — SYSTOLIC BLOOD PRESSURE: 126 MMHG | DIASTOLIC BLOOD PRESSURE: 74 MMHG

## 2024-04-10 DIAGNOSIS — Z00.00 MEDICARE ANNUAL WELLNESS VISIT, SUBSEQUENT: Primary | ICD-10-CM

## 2024-04-10 PROCEDURE — 3017F COLORECTAL CA SCREEN DOC REV: CPT | Performed by: STUDENT IN AN ORGANIZED HEALTH CARE EDUCATION/TRAINING PROGRAM

## 2024-04-10 PROCEDURE — 3078F DIAST BP <80 MM HG: CPT | Performed by: STUDENT IN AN ORGANIZED HEALTH CARE EDUCATION/TRAINING PROGRAM

## 2024-04-10 PROCEDURE — G0439 PPPS, SUBSEQ VISIT: HCPCS | Performed by: STUDENT IN AN ORGANIZED HEALTH CARE EDUCATION/TRAINING PROGRAM

## 2024-04-10 PROCEDURE — 1123F ACP DISCUSS/DSCN MKR DOCD: CPT | Performed by: STUDENT IN AN ORGANIZED HEALTH CARE EDUCATION/TRAINING PROGRAM

## 2024-04-10 PROCEDURE — 3074F SYST BP LT 130 MM HG: CPT | Performed by: STUDENT IN AN ORGANIZED HEALTH CARE EDUCATION/TRAINING PROGRAM

## 2024-04-10 SDOH — ECONOMIC STABILITY: INCOME INSECURITY: HOW HARD IS IT FOR YOU TO PAY FOR THE VERY BASICS LIKE FOOD, HOUSING, MEDICAL CARE, AND HEATING?: NOT HARD AT ALL

## 2024-04-10 SDOH — ECONOMIC STABILITY: HOUSING INSECURITY
IN THE LAST 12 MONTHS, WAS THERE A TIME WHEN YOU DID NOT HAVE A STEADY PLACE TO SLEEP OR SLEPT IN A SHELTER (INCLUDING NOW)?: NO

## 2024-04-10 SDOH — ECONOMIC STABILITY: FOOD INSECURITY: WITHIN THE PAST 12 MONTHS, THE FOOD YOU BOUGHT JUST DIDN'T LAST AND YOU DIDN'T HAVE MONEY TO GET MORE.: NEVER TRUE

## 2024-04-10 SDOH — ECONOMIC STABILITY: FOOD INSECURITY: WITHIN THE PAST 12 MONTHS, YOU WORRIED THAT YOUR FOOD WOULD RUN OUT BEFORE YOU GOT MONEY TO BUY MORE.: NEVER TRUE

## 2024-04-10 ASSESSMENT — PATIENT HEALTH QUESTIONNAIRE - PHQ9
SUM OF ALL RESPONSES TO PHQ9 QUESTIONS 1 & 2: 0
SUM OF ALL RESPONSES TO PHQ QUESTIONS 1-9: 0
1. LITTLE INTEREST OR PLEASURE IN DOING THINGS: NOT AT ALL
2. FEELING DOWN, DEPRESSED OR HOPELESS: NOT AT ALL
SUM OF ALL RESPONSES TO PHQ QUESTIONS 1-9: 0

## 2024-04-10 ASSESSMENT — LIFESTYLE VARIABLES
HOW MANY STANDARD DRINKS CONTAINING ALCOHOL DO YOU HAVE ON A TYPICAL DAY: 1 OR 2
HOW OFTEN DO YOU HAVE A DRINK CONTAINING ALCOHOL: MONTHLY OR LESS

## 2024-04-10 NOTE — PATIENT INSTRUCTIONS

## 2024-04-10 NOTE — PROGRESS NOTES
Medicare Annual Wellness Visit    Claude Cain is here for Medicare AWV    Assessment & Plan   Medicare annual wellness visit, subsequent  Recommendations for Preventive Services Due: see orders and patient instructions/AVS.  Recommended screening schedule for the next 5-10 years is provided to the patient in written form: see Patient Instructions/AVS.     Return for Medicare Annual Wellness Visit in 1 year.     Subjective     Patient's complete Health Risk Assessment and screening values have been reviewed and are found in Flowsheets. The following problems were reviewed today and where indicated follow up appointments were made and/or referrals ordered.    Positive Risk Factor Screenings with Interventions:                Activity, Diet, and Weight:  On average, how many days per week do you engage in moderate to strenuous exercise (like a brisk walk)?: 4 days  On average, how many minutes do you engage in exercise at this level?: 30 min    Do you eat balanced/healthy meals regularly?: Yes    There is no height or weight on file to calculate BMI. (!) Abnormal          Vision Screen:  Do you have difficulty driving, watching TV, or doing any of your daily activities because of your eyesight?: No  Have you had an eye exam within the past year?: (!) No  No results found.    Interventions:   Patient encouraged to make appointment with their eye specialist      Advanced Directives:  Do you have a Living Will?: (!) No    Intervention:  has NO advanced directive - information provided             Objective   Vitals:    04/10/24 0911   BP: 126/74      There is no height or weight on file to calculate BMI.             Allergies   Allergen Reactions    Seasonal      Prior to Visit Medications    Medication Sig Taking? Authorizing Provider   methylPREDNISolone (MEDROL, ROBBIN,) 4 MG tablet Take by mouth. 6 po day one 5 po day 2 4 po day 3 3 po day 4 2 po day 5 1 po day 6. Yes Serge Mack, PA   metoprolol succinate (TOPROL

## 2024-04-16 ENCOUNTER — OFFICE VISIT (OUTPATIENT)
Dept: PRIMARY CARE CLINIC | Age: 69
End: 2024-04-16

## 2024-04-16 VITALS
WEIGHT: 221 LBS | TEMPERATURE: 97.3 F | HEART RATE: 70 BPM | DIASTOLIC BLOOD PRESSURE: 78 MMHG | HEIGHT: 71 IN | BODY MASS INDEX: 30.94 KG/M2 | SYSTOLIC BLOOD PRESSURE: 130 MMHG

## 2024-04-16 DIAGNOSIS — E78.00 HYPERCHOLESTEROLEMIA: ICD-10-CM

## 2024-04-16 DIAGNOSIS — I10 ESSENTIAL HYPERTENSION: ICD-10-CM

## 2024-04-16 DIAGNOSIS — Z12.5 SCREENING FOR MALIGNANT NEOPLASM OF PROSTATE: ICD-10-CM

## 2024-04-16 DIAGNOSIS — I10 ESSENTIAL HYPERTENSION: Primary | ICD-10-CM

## 2024-04-16 DIAGNOSIS — Z13.1 SCREENING FOR DIABETES MELLITUS: ICD-10-CM

## 2024-04-16 DIAGNOSIS — R97.20 ELEVATED PROSTATE SPECIFIC ANTIGEN (PSA): ICD-10-CM

## 2024-04-16 PROBLEM — M54.50 LUMBAR PAIN WITH RADIATION DOWN RIGHT LEG: Status: RESOLVED | Noted: 2024-03-20 | Resolved: 2024-04-16

## 2024-04-16 PROBLEM — M79.604 LUMBAR PAIN WITH RADIATION DOWN RIGHT LEG: Status: RESOLVED | Noted: 2024-03-20 | Resolved: 2024-04-16

## 2024-04-16 RX ORDER — METOPROLOL SUCCINATE 100 MG/1
100 TABLET, EXTENDED RELEASE ORAL DAILY
Qty: 90 TABLET | Refills: 3 | Status: SHIPPED | OUTPATIENT
Start: 2024-04-16

## 2024-04-16 RX ORDER — SIMVASTATIN 20 MG
TABLET ORAL
Qty: 90 TABLET | Refills: 3 | Status: SHIPPED | OUTPATIENT
Start: 2024-04-16

## 2024-04-16 RX ORDER — AMLODIPINE BESYLATE 5 MG/1
5 TABLET ORAL DAILY
Qty: 90 TABLET | Refills: 3 | Status: SHIPPED | OUTPATIENT
Start: 2024-04-16

## 2024-04-16 ASSESSMENT — ENCOUNTER SYMPTOMS
CHEST TIGHTNESS: 0
SHORTNESS OF BREATH: 0
COUGH: 0

## 2024-04-16 NOTE — PROGRESS NOTES
2024     Claude Cain (:  1955) is a 68 y.o. male, here for evaluation of the following medical concerns:    HPI  Hyperlipidemia:  No new myalgias or GI upset on simvastatin (Zocor). Medication compliance: compliant most of the time. Patient is not following a low fat, low cholesterol diet.  He is  exercising regularly.     Lab Results   Component Value Date    CHOL 175 04/10/2023    TRIG 238 (H) 04/10/2023    HDL 50 04/10/2023    LDLCALC 77 04/10/2023     Lab Results   Component Value Date    ALT 32 04/10/2023    AST 27 04/10/2023        Hypertension:  Home blood pressure monitoring: No.  He is not adherent to a low sodium diet. Patient denies chest pain, shortness of breath, headache, lightheadedness, blurred vision, peripheral edema, palpitations, dry cough, and fatigue.  Antihypertensive medication side effects: no medication side effects noted.  Use of agents associated with hypertension: none.                                        Sodium (mmol/L)   Date Value   04/10/2023 140    BUN (mg/dL)   Date Value   04/10/2023 7    Glucose (mg/dL)   Date Value   04/10/2023 102 (H)      Potassium (mmol/L)   Date Value   04/10/2023 4.7    Creatinine (mg/dL)   Date Value   04/10/2023 0.8           Review of Systems   Constitutional:  Negative for fatigue.   Eyes:  Negative for visual disturbance.   Respiratory:  Negative for cough, chest tightness and shortness of breath.    Cardiovascular:  Negative for chest pain, palpitations and leg swelling.   Endocrine: Negative for polydipsia, polyphagia and polyuria.   Genitourinary:  Negative for frequency.   Skin:  Negative for rash.   Neurological:  Negative for dizziness, syncope, weakness and light-headedness.       Prior to Visit Medications    Medication Sig Taking? Authorizing Provider   amLODIPine (NORVASC) 5 MG tablet Take 1 tablet by mouth daily Yes Marcel Messer DO   metoprolol succinate (TOPROL XL) 100 MG extended release tablet Take 1 tablet by mouth

## 2024-04-17 LAB
ANION GAP SERPL CALCULATED.3IONS-SCNC: 12 MMOL/L (ref 3–16)
BUN SERPL-MCNC: 9 MG/DL (ref 7–20)
CALCIUM SERPL-MCNC: 9.7 MG/DL (ref 8.3–10.6)
CHLORIDE SERPL-SCNC: 103 MMOL/L (ref 99–110)
CHOLEST SERPL-MCNC: 179 MG/DL (ref 0–199)
CO2 SERPL-SCNC: 26 MMOL/L (ref 21–32)
CREAT SERPL-MCNC: 1 MG/DL (ref 0.8–1.3)
EST. AVERAGE GLUCOSE BLD GHB EST-MCNC: 105.4 MG/DL
GFR SERPLBLD CREATININE-BSD FMLA CKD-EPI: 82 ML/MIN/{1.73_M2}
GLUCOSE SERPL-MCNC: 99 MG/DL (ref 70–99)
HBA1C MFR BLD: 5.3 %
HDLC SERPL-MCNC: 53 MG/DL (ref 40–60)
LDL CHOLESTEROL CALCULATED: 89 MG/DL
POTASSIUM SERPL-SCNC: 4.4 MMOL/L (ref 3.5–5.1)
SODIUM SERPL-SCNC: 141 MMOL/L (ref 136–145)
TRIGL SERPL-MCNC: 186 MG/DL (ref 0–150)
VLDLC SERPL CALC-MCNC: 37 MG/DL

## 2024-10-28 ENCOUNTER — OFFICE VISIT (OUTPATIENT)
Dept: PRIMARY CARE CLINIC | Age: 69
End: 2024-10-28

## 2024-10-28 VITALS
WEIGHT: 223 LBS | HEIGHT: 71 IN | SYSTOLIC BLOOD PRESSURE: 122 MMHG | DIASTOLIC BLOOD PRESSURE: 74 MMHG | TEMPERATURE: 97.4 F | HEART RATE: 64 BPM | BODY MASS INDEX: 31.22 KG/M2

## 2024-10-28 DIAGNOSIS — E78.00 HYPERCHOLESTEROLEMIA: ICD-10-CM

## 2024-10-28 DIAGNOSIS — I10 ESSENTIAL HYPERTENSION: Primary | ICD-10-CM

## 2024-10-28 ASSESSMENT — ENCOUNTER SYMPTOMS
WHEEZING: 0
SHORTNESS OF BREATH: 0
NAUSEA: 0

## 2024-10-28 NOTE — PROGRESS NOTES
Although all attempts are made to edit the dictation for accuracy, there may be errors in the transcription that are not intended.

## 2025-04-28 ENCOUNTER — OFFICE VISIT (OUTPATIENT)
Dept: PRIMARY CARE CLINIC | Age: 70
End: 2025-04-28
Payer: MEDICARE

## 2025-04-28 VITALS
TEMPERATURE: 98.6 F | SYSTOLIC BLOOD PRESSURE: 123 MMHG | HEIGHT: 71 IN | DIASTOLIC BLOOD PRESSURE: 76 MMHG | WEIGHT: 223.4 LBS | HEART RATE: 71 BPM | BODY MASS INDEX: 31.27 KG/M2

## 2025-04-28 DIAGNOSIS — E78.00 HYPERCHOLESTEROLEMIA: ICD-10-CM

## 2025-04-28 DIAGNOSIS — I10 ESSENTIAL HYPERTENSION: ICD-10-CM

## 2025-04-28 DIAGNOSIS — Z00.00 MEDICARE ANNUAL WELLNESS VISIT, SUBSEQUENT: Primary | ICD-10-CM

## 2025-04-28 PROCEDURE — G8427 DOCREV CUR MEDS BY ELIG CLIN: HCPCS | Performed by: STUDENT IN AN ORGANIZED HEALTH CARE EDUCATION/TRAINING PROGRAM

## 2025-04-28 PROCEDURE — G0439 PPPS, SUBSEQ VISIT: HCPCS | Performed by: STUDENT IN AN ORGANIZED HEALTH CARE EDUCATION/TRAINING PROGRAM

## 2025-04-28 PROCEDURE — 99214 OFFICE O/P EST MOD 30 MIN: CPT | Performed by: STUDENT IN AN ORGANIZED HEALTH CARE EDUCATION/TRAINING PROGRAM

## 2025-04-28 PROCEDURE — 1160F RVW MEDS BY RX/DR IN RCRD: CPT | Performed by: STUDENT IN AN ORGANIZED HEALTH CARE EDUCATION/TRAINING PROGRAM

## 2025-04-28 PROCEDURE — 1036F TOBACCO NON-USER: CPT | Performed by: STUDENT IN AN ORGANIZED HEALTH CARE EDUCATION/TRAINING PROGRAM

## 2025-04-28 PROCEDURE — 1159F MED LIST DOCD IN RCRD: CPT | Performed by: STUDENT IN AN ORGANIZED HEALTH CARE EDUCATION/TRAINING PROGRAM

## 2025-04-28 PROCEDURE — 3078F DIAST BP <80 MM HG: CPT | Performed by: STUDENT IN AN ORGANIZED HEALTH CARE EDUCATION/TRAINING PROGRAM

## 2025-04-28 PROCEDURE — 1123F ACP DISCUSS/DSCN MKR DOCD: CPT | Performed by: STUDENT IN AN ORGANIZED HEALTH CARE EDUCATION/TRAINING PROGRAM

## 2025-04-28 PROCEDURE — 3074F SYST BP LT 130 MM HG: CPT | Performed by: STUDENT IN AN ORGANIZED HEALTH CARE EDUCATION/TRAINING PROGRAM

## 2025-04-28 PROCEDURE — 3017F COLORECTAL CA SCREEN DOC REV: CPT | Performed by: STUDENT IN AN ORGANIZED HEALTH CARE EDUCATION/TRAINING PROGRAM

## 2025-04-28 PROCEDURE — G8417 CALC BMI ABV UP PARAM F/U: HCPCS | Performed by: STUDENT IN AN ORGANIZED HEALTH CARE EDUCATION/TRAINING PROGRAM

## 2025-04-28 SDOH — ECONOMIC STABILITY: FOOD INSECURITY: WITHIN THE PAST 12 MONTHS, THE FOOD YOU BOUGHT JUST DIDN'T LAST AND YOU DIDN'T HAVE MONEY TO GET MORE.: NEVER TRUE

## 2025-04-28 SDOH — ECONOMIC STABILITY: FOOD INSECURITY: WITHIN THE PAST 12 MONTHS, YOU WORRIED THAT YOUR FOOD WOULD RUN OUT BEFORE YOU GOT MONEY TO BUY MORE.: NEVER TRUE

## 2025-04-28 ASSESSMENT — PATIENT HEALTH QUESTIONNAIRE - PHQ9
SUM OF ALL RESPONSES TO PHQ QUESTIONS 1-9: 0
2. FEELING DOWN, DEPRESSED OR HOPELESS: NOT AT ALL
SUM OF ALL RESPONSES TO PHQ QUESTIONS 1-9: 0
1. LITTLE INTEREST OR PLEASURE IN DOING THINGS: NOT AT ALL

## 2025-04-28 ASSESSMENT — LIFESTYLE VARIABLES
HOW MANY STANDARD DRINKS CONTAINING ALCOHOL DO YOU HAVE ON A TYPICAL DAY: 1 OR 2
HOW OFTEN DO YOU HAVE A DRINK CONTAINING ALCOHOL: 2-4 TIMES A MONTH

## 2025-04-28 NOTE — PROGRESS NOTES
Medicare Annual Wellness Visit    Calude Cain is here for Blood Pressure Check and Hyperlipidemia    Assessment & Plan   Medicare annual wellness visit, subsequent    Essential hypertension  Controlled, continue current medication regimen and update labs today  - Comprehensive Metabolic Panel; Future  - Lipid Panel; Future     Hypercholesterolemia  Controlled, continue simvastatin and update labs today  - Lipid Panel; Future        Return in about 6 months (around 10/28/2025) for blood pressure, cholesterol.     Subjective   The following acute and/or chronic problems were also addressed today:    Patient presents today for follow-up on hypertension, hyperlipidemia.     He is taking amlodipine 5 mg, metoprolol 100 mg daily as prescribed for his hypertension with good control.     He continues to take simvastatin, and tolerates well without myalgias.  He remains physically active with golfing.    Patient's complete Health Risk Assessment and screening values have been reviewed and are found in Flowsheets. The following problems were reviewed today and where indicated follow up appointments were made and/or referrals ordered.    Positive Risk Factor Screenings with Interventions:               Poor Eating Habits/Diet:  Do you eat balanced/healthy meals regularly?: (!) No  Interventions:  Patient declines any further evaluation or treatment    Abnormal BMI (obese):  Body mass index is 31.16 kg/m². (!) Abnormal  Interventions:  Patient declines any further evaluation or treatment          Vision Screen:  Do you have difficulty driving, watching TV, or doing any of your daily activities because of your eyesight?: No  Have you had an eye exam within the past year?: (!) No  Interventions:   Patient encouraged to make appointment with their eye specialist      Advanced Directives:  Do you have a Living Will?: (!) No    Intervention:  has an advanced directive - a copy HAS NOT been provided.

## 2025-04-28 NOTE — PATIENT INSTRUCTIONS

## 2025-04-29 ENCOUNTER — RESULTS FOLLOW-UP (OUTPATIENT)
Dept: PRIMARY CARE CLINIC | Age: 70
End: 2025-04-29

## 2025-04-29 LAB
ALBUMIN SERPL-MCNC: 4.6 G/DL (ref 3.4–5)
ALBUMIN/GLOB SERPL: 1.5 {RATIO} (ref 1.1–2.2)
ALP SERPL-CCNC: 59 U/L (ref 40–129)
ALT SERPL-CCNC: 35 U/L (ref 10–40)
ANION GAP SERPL CALCULATED.3IONS-SCNC: 10 MMOL/L (ref 3–16)
AST SERPL-CCNC: 33 U/L (ref 15–37)
BILIRUB SERPL-MCNC: 0.7 MG/DL (ref 0–1)
BUN SERPL-MCNC: 14 MG/DL (ref 7–20)
CALCIUM SERPL-MCNC: 9.5 MG/DL (ref 8.3–10.6)
CHLORIDE SERPL-SCNC: 104 MMOL/L (ref 99–110)
CHOLEST SERPL-MCNC: 154 MG/DL (ref 0–199)
CO2 SERPL-SCNC: 25 MMOL/L (ref 21–32)
CREAT SERPL-MCNC: 0.9 MG/DL (ref 0.8–1.3)
GFR SERPLBLD CREATININE-BSD FMLA CKD-EPI: >90 ML/MIN/{1.73_M2}
GLUCOSE SERPL-MCNC: 92 MG/DL (ref 70–99)
HDLC SERPL-MCNC: 58 MG/DL (ref 40–60)
LDLC SERPL CALC-MCNC: 74 MG/DL
POTASSIUM SERPL-SCNC: 4.3 MMOL/L (ref 3.5–5.1)
PROT SERPL-MCNC: 7.6 G/DL (ref 6.4–8.2)
SODIUM SERPL-SCNC: 139 MMOL/L (ref 136–145)
TRIGL SERPL-MCNC: 110 MG/DL (ref 0–150)
VLDLC SERPL CALC-MCNC: 22 MG/DL

## 2025-05-25 DIAGNOSIS — I10 ESSENTIAL HYPERTENSION: ICD-10-CM

## 2025-05-25 DIAGNOSIS — E78.00 HYPERCHOLESTEROLEMIA: ICD-10-CM

## 2025-05-27 RX ORDER — METOPROLOL SUCCINATE 100 MG/1
100 TABLET, EXTENDED RELEASE ORAL DAILY
Qty: 90 TABLET | Refills: 3 | Status: SHIPPED | OUTPATIENT
Start: 2025-05-27

## 2025-05-27 RX ORDER — AMLODIPINE BESYLATE 5 MG/1
5 TABLET ORAL DAILY
Qty: 90 TABLET | Refills: 3 | Status: SHIPPED | OUTPATIENT
Start: 2025-05-27

## 2025-05-27 RX ORDER — SIMVASTATIN 20 MG
20 TABLET ORAL NIGHTLY
Qty: 90 TABLET | Refills: 3 | Status: SHIPPED | OUTPATIENT
Start: 2025-05-27

## 2025-05-27 NOTE — TELEPHONE ENCOUNTER
Medication:   Requested Prescriptions     Signed Prescriptions Disp Refills    simvastatin (ZOCOR) 20 MG tablet 90 tablet 3     Sig: TAKE 1 TABLET BY MOUTH EVERY DAY AT NIGHT     Authorizing Provider: TONNY JORDAN     Ordering User: NICOLE TALAMANTES    amLODIPine (NORVASC) 5 MG tablet 90 tablet 3     Sig: TAKE 1 TABLET BY MOUTH EVERY DAY     Authorizing Provider: TONNY JORDAN     Ordering User: NICOLE TALAMANTES    metoprolol succinate (TOPROL XL) 100 MG extended release tablet 90 tablet 3     Sig: TAKE 1 TABLET BY MOUTH EVERY DAY     Authorizing Provider: TONNY JORDAN     Ordering User: NICOLE TALAMANTES        Last Filled:      Patient Phone Number: [phone] (home)     Last appt: 4/28/2025   Next appt: 10/27/2025    Last OARRS:        No data to display